# Patient Record
Sex: FEMALE | Race: OTHER
[De-identification: names, ages, dates, MRNs, and addresses within clinical notes are randomized per-mention and may not be internally consistent; named-entity substitution may affect disease eponyms.]

---

## 2022-12-02 ENCOUNTER — HOSPITAL ENCOUNTER (INPATIENT)
Dept: HOSPITAL 47 - EC | Age: 87
LOS: 7 days | Discharge: NURSING, REHAB OR LONG-TERM CARE | DRG: 597 | End: 2022-12-09
Attending: HOSPITALIST | Admitting: HOSPITALIST
Payer: MEDICARE

## 2022-12-02 VITALS — BODY MASS INDEX: 22.5 KG/M2

## 2022-12-02 DIAGNOSIS — L03.116: ICD-10-CM

## 2022-12-02 DIAGNOSIS — C79.2: ICD-10-CM

## 2022-12-02 DIAGNOSIS — Z91.81: ICD-10-CM
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
## 2022-12-02 DIAGNOSIS — Z87.891: ICD-10-CM

## 2022-12-02 DIAGNOSIS — S32.019A: ICD-10-CM

## 2022-12-02 DIAGNOSIS — Z79.1: ICD-10-CM

## 2022-12-02 DIAGNOSIS — L03.313: ICD-10-CM

## 2022-12-02 DIAGNOSIS — B95.7: ICD-10-CM

## 2022-12-02 DIAGNOSIS — I89.0: ICD-10-CM

## 2022-12-02 DIAGNOSIS — R29.6: ICD-10-CM

## 2022-12-02 DIAGNOSIS — C50.912: Primary | ICD-10-CM

## 2022-12-02 DIAGNOSIS — R26.9: ICD-10-CM

## 2022-12-02 DIAGNOSIS — Z17.0: ICD-10-CM

## 2022-12-02 DIAGNOSIS — Z80.3: ICD-10-CM

## 2022-12-02 DIAGNOSIS — B96.89: ICD-10-CM

## 2022-12-02 DIAGNOSIS — L98.492: ICD-10-CM

## 2022-12-02 DIAGNOSIS — R00.0: ICD-10-CM
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
## 2022-12-02 DIAGNOSIS — M51.36: ICD-10-CM

## 2022-12-02 DIAGNOSIS — Z91.199: ICD-10-CM

## 2022-12-02 DIAGNOSIS — S21.002A: ICD-10-CM

## 2022-12-02 DIAGNOSIS — M47.816: ICD-10-CM

## 2022-12-02 DIAGNOSIS — E89.0: ICD-10-CM

## 2022-12-02 DIAGNOSIS — I48.20: ICD-10-CM

## 2022-12-02 DIAGNOSIS — I48.92: ICD-10-CM

## 2022-12-02 DIAGNOSIS — I96: ICD-10-CM

## 2022-12-02 DIAGNOSIS — M79.89: ICD-10-CM

## 2022-12-02 DIAGNOSIS — J90: ICD-10-CM

## 2022-12-02 DIAGNOSIS — G89.29: ICD-10-CM

## 2022-12-02 DIAGNOSIS — J18.9: ICD-10-CM

## 2022-12-02 DIAGNOSIS — M48.54XA: ICD-10-CM

## 2022-12-02 LAB
ALBUMIN SERPL-MCNC: 3.6 G/DL (ref 3.5–5)
ALP SERPL-CCNC: 61 U/L (ref 38–126)
ALT SERPL-CCNC: 24 U/L (ref 4–34)
AMYLASE SERPL-CCNC: 76 U/L (ref 30–110)
ANION GAP SERPL CALC-SCNC: 8 MMOL/L
APTT BLD: 23.3 SEC (ref 22–30)
AST SERPL-CCNC: 34 U/L (ref 14–36)
BASOPHILS # BLD AUTO: 0 K/UL (ref 0–0.2)
BASOPHILS NFR BLD AUTO: 1 %
BUN SERPL-SCNC: 28 MG/DL (ref 7–17)
CALCIUM SPEC-MCNC: 8.9 MG/DL (ref 8.4–10.2)
CHLORIDE SERPL-SCNC: 106 MMOL/L (ref 98–107)
CO2 SERPL-SCNC: 26 MMOL/L (ref 22–30)
EOSINOPHIL # BLD AUTO: 0.1 K/UL (ref 0–0.7)
EOSINOPHIL NFR BLD AUTO: 1 %
ERYTHROCYTE [DISTWIDTH] IN BLOOD BY AUTOMATED COUNT: 3.99 M/UL (ref 3.8–5.4)
ERYTHROCYTE [DISTWIDTH] IN BLOOD: 14.2 % (ref 11.5–15.5)
GLUCOSE SERPL-MCNC: 99 MG/DL (ref 74–99)
HCT VFR BLD AUTO: 40.7 % (ref 34–46)
HGB BLD-MCNC: 13.2 GM/DL (ref 11.4–16)
INR PPP: 0.9 (ref ?–1.2)
LIPASE SERPL-CCNC: 162 U/L (ref 23–300)
LYMPHOCYTES # SPEC AUTO: 0.6 K/UL (ref 1–4.8)
LYMPHOCYTES NFR SPEC AUTO: 10 %
MCH RBC QN AUTO: 33.1 PG (ref 25–35)
MCHC RBC AUTO-ENTMCNC: 32.5 G/DL (ref 31–37)
MCV RBC AUTO: 101.8 FL (ref 80–100)
MONOCYTES # BLD AUTO: 0.6 K/UL (ref 0–1)
MONOCYTES NFR BLD AUTO: 10 %
NEUTROPHILS # BLD AUTO: 4.7 K/UL (ref 1.3–7.7)
NEUTROPHILS NFR BLD AUTO: 78 %
PLATELET # BLD AUTO: 330 K/UL (ref 150–450)
POTASSIUM SERPL-SCNC: 4.2 MMOL/L (ref 3.5–5.1)
PROT SERPL-MCNC: 6.4 G/DL (ref 6.3–8.2)
PT BLD: 9.7 SEC (ref 9–12)
SODIUM SERPL-SCNC: 140 MMOL/L (ref 137–145)
WBC # BLD AUTO: 6 K/UL (ref 3.8–10.6)

## 2022-12-02 PROCEDURE — 70450 CT HEAD/BRAIN W/O DYE: CPT

## 2022-12-02 PROCEDURE — 87070 CULTURE OTHR SPECIMN AEROBIC: CPT

## 2022-12-02 PROCEDURE — 83880 ASSAY OF NATRIURETIC PEPTIDE: CPT

## 2022-12-02 PROCEDURE — 83690 ASSAY OF LIPASE: CPT

## 2022-12-02 PROCEDURE — 76937 US GUIDE VASCULAR ACCESS: CPT

## 2022-12-02 PROCEDURE — 72125 CT NECK SPINE W/O DYE: CPT

## 2022-12-02 PROCEDURE — 80053 COMPREHEN METABOLIC PANEL: CPT

## 2022-12-02 PROCEDURE — 72129 CT CHEST SPINE W/DYE: CPT

## 2022-12-02 PROCEDURE — 72132 CT LUMBAR SPINE W/DYE: CPT

## 2022-12-02 PROCEDURE — 87077 CULTURE AEROBIC IDENTIFY: CPT

## 2022-12-02 PROCEDURE — 81003 URINALYSIS AUTO W/O SCOPE: CPT

## 2022-12-02 PROCEDURE — 85610 PROTHROMBIN TIME: CPT

## 2022-12-02 PROCEDURE — 84439 ASSAY OF FREE THYROXINE: CPT

## 2022-12-02 PROCEDURE — 85025 COMPLETE CBC W/AUTO DIFF WBC: CPT

## 2022-12-02 PROCEDURE — 93005 ELECTROCARDIOGRAM TRACING: CPT

## 2022-12-02 PROCEDURE — 88341 IMHCHEM/IMCYTCHM EA ADD ANTB: CPT

## 2022-12-02 PROCEDURE — 86850 RBC ANTIBODY SCREEN: CPT

## 2022-12-02 PROCEDURE — 86901 BLOOD TYPING SEROLOGIC RH(D): CPT

## 2022-12-02 PROCEDURE — 82150 ASSAY OF AMYLASE: CPT

## 2022-12-02 PROCEDURE — 85730 THROMBOPLASTIN TIME PARTIAL: CPT

## 2022-12-02 PROCEDURE — 83605 ASSAY OF LACTIC ACID: CPT

## 2022-12-02 PROCEDURE — 84443 ASSAY THYROID STIM HORMONE: CPT

## 2022-12-02 PROCEDURE — 96375 TX/PRO/DX INJ NEW DRUG ADDON: CPT

## 2022-12-02 PROCEDURE — 88342 IMHCHEM/IMCYTCHM 1ST ANTB: CPT

## 2022-12-02 PROCEDURE — 72170 X-RAY EXAM OF PELVIS: CPT

## 2022-12-02 PROCEDURE — 86900 BLOOD TYPING SEROLOGIC ABO: CPT

## 2022-12-02 PROCEDURE — 96365 THER/PROPH/DIAG IV INF INIT: CPT

## 2022-12-02 PROCEDURE — 96367 TX/PROPH/DG ADDL SEQ IV INF: CPT

## 2022-12-02 PROCEDURE — 99291 CRITICAL CARE FIRST HOUR: CPT

## 2022-12-02 PROCEDURE — 87040 BLOOD CULTURE FOR BACTERIA: CPT

## 2022-12-02 PROCEDURE — 96366 THER/PROPH/DIAG IV INF ADDON: CPT

## 2022-12-02 PROCEDURE — 74177 CT ABD & PELVIS W/CONTRAST: CPT

## 2022-12-02 PROCEDURE — 96372 THER/PROPH/DIAG INJ SC/IM: CPT

## 2022-12-02 PROCEDURE — 36410 VNPNXR 3YR/> PHY/QHP DX/THER: CPT

## 2022-12-02 PROCEDURE — 36415 COLL VENOUS BLD VENIPUNCTURE: CPT

## 2022-12-02 PROCEDURE — 87075 CULTR BACTERIA EXCEPT BLOOD: CPT

## 2022-12-02 PROCEDURE — 87186 SC STD MICRODIL/AGAR DIL: CPT

## 2022-12-02 PROCEDURE — 78306 BONE IMAGING WHOLE BODY: CPT

## 2022-12-02 PROCEDURE — 80048 BASIC METABOLIC PNL TOTAL CA: CPT

## 2022-12-02 PROCEDURE — 87205 SMEAR GRAM STAIN: CPT

## 2022-12-02 PROCEDURE — 87635 SARS-COV-2 COVID-19 AMP PRB: CPT

## 2022-12-02 PROCEDURE — 88305 TISSUE EXAM BY PATHOLOGIST: CPT

## 2022-12-02 PROCEDURE — 96361 HYDRATE IV INFUSION ADD-ON: CPT

## 2022-12-02 PROCEDURE — 71260 CT THORAX DX C+: CPT

## 2022-12-02 NOTE — CT
EXAMINATION TYPE: CT thor lumbar spine w con

 

DATE OF EXAM: 12/2/2022

 

COMPARISON: None

 

HISTORY: head trauma, minor fall

 

CT DLP: 2056.7 mGycm

Automated exposure control for dose reduction was used.

 

CONTRAST: 

Performed with IV Contrast, patient injected with 100 mL of Isovue 370.

 

 

Images obtained from T1 to S1 with the IV contrast.

 

There is osteopenia. There is no thoracic paraspinal mass. There is left pleural effusion and left lo
wer lobe infiltrate and atelectasis.

 

There is compression deformities with anterior wedging of T12 and L1 and L2 up to 25%. Fractures coul
d be acute. The sacroiliac joints are intact. No focal bone destruction.

 

IMPRESSION:

Multiple compression fractures could be acute fractures in the thoracic and lumbar spine as above..

## 2022-12-02 NOTE — US
EXAMINATION TYPE: US venous doppler duplex UE 

 

DATE OF EXAM: 12/2/2022

 

COMPARISON: NONE

 

CLINICAL HISTORY: Patient has severe swelling of left arm with extensive interstitial edema.

 

SIDE PERFORMED: Left

 

 

Left Arm: Exam very limited due to patient mobility and severe interstitial edema.  Limited visualiza
tion of subclavian vein shows probable patent vein. Flow is thready at this area. Unable to visualize
 axilla vein, brachial vein seen only at antecubital fossa. No obvious DVT, however exam severely pratt
ited. Basilic vein not seen.  

 

 

 

IMPRESSION: 

Exam is limited. No evidence of acute deep vein thrombosis in the left arm. There is some thready jessy
earing flow in the subclavian vein and some limited chronic deep vein thrombosis is possible.

## 2022-12-02 NOTE — CT
EXAMINATION TYPE: CT brain mike wo con

 

DATE OF EXAM: 12/2/2022

 

COMPARISON: None

 

HISTORY: head trauma, minor fall

 

CT DLP: 2056.7 mGycm

Automated exposure control for dose reduction was used.

 

Images of the brain and cervical spine obtained with no contrast.

 

There is diffuse cerebral cortical atrophy. There is no mass effect or midline shift. No sign of intr
acranial hemorrhage. There is some hypodensity in the periventricular white matter. Calvarium is inta
ct.

 

The cervical vertebra show no compression fracture. No significant subluxation deformity. There is mu
ltilevel mild cervical facet arthropathy. The skull base is intact. There is normal aeration of the m
astoid sinuses. Occipital bone is intact.

 

IMPRESSION:

Cerebral atrophy and chronic small vessel ischemia. No acute intracranial abnormality.

 

Mild multilevel cervical spondylotic changes. No fracture seen.

## 2022-12-02 NOTE — CT
EXAMINATION TYPE: CT ChestAbdPelvis w con

 

DATE OF EXAM: 12/2/2022

 

COMPARISON: None

 

HISTORY: head trauma, minor fall

 

CT DLP: 2056.7 mGycm

Automated exposure control for dose reduction was used.

 

CONTRAST: 

Performed with IV Contrast, patient injected with 100 mL of Isovue 370.

 

Images obtained from the thoracic inlet to the floor the pelvis with the IV contrast.

 

There is moderate left-sided pleural effusion. There is airspace consolidation and atelectasis left l
ower lobe. The right lung is fairly clear. Heart is enlarged. No mediastinal adenopathy. There are no
 hilar masses. Thoracic aorta is intact. No aneurysm or dissection. No filling defect seen in the pul
monary arteries.

 

Liver and spleen are intact. There is elevated left diaphragm. Stomach is not dilated. Gallbladder ap
pears intact. No dilated ducts.

 

There is normal contrast opacification of the kidneys. No hydronephrosis. There is a large urinary bl
adder. No free fluid in the pelvis. There is dilated rectum with fecal material that measures 8.5 cm.
 No ascites. No evidence of a bowel obstruction. No free air. The bony pelvis is intact. No evidence 
of hip fracture. There is L2 compression fracture 25%. There is also compression fracture L1 and T12 
up to 20%. These fractures could be relatively acute. The sternum is intact. No evidence of any displ
aced rib fracture. There is evidence for old lateral healed right-sided rib fracture.

Delayed images show normal renal excretion.

IMPRESSION:

Thoracic and lumbar mild compression fractures could be acute. Rectal fecal impaction.

 

Consolidation and atelectasis left lower lobe with left pleural effusion. Mild cardiomegaly..

## 2022-12-02 NOTE — ED
General Adult HPI





- General


Chief complaint: Skin/Abscess/Foreign Body


Stated complaint: lt breast infection, weakness


Time Seen by Provider: 12/02/22 15:15


Source: patient, EMS, RN notes reviewed, old records reviewed


Mode of arrival: EMS


Limitations: no limitations





- History of Present Illness


Initial comments: 





Patient is an 89-year-old female with past medical history remarkable for 

thyroid disorder currently on no medications who has not seen a doctor for 3 

years presents to the emergency department complaining of a worsening wound 

located in her left armpit, left breast.  She is concerned that it may be 

cancer.  States it has been present for multiple months to years, and the open 

wound aspect of it has been present for at least multiple months.  She presents 

today as it is been losing a foul-smelling liquid more, as well as bleeding at t

he site.  She denies any fevers.  Denies any nausea, vomiting, diarrhea.  Denies

any recent weight loss.  States she has been falling more lately, has chronic 

back pain since a fall back in July.  Last fell yesterday.  No obvious injuries 

yesterday.  She denies any jillian chest pain, shortness of breath, abdominal 

pain, nausea, vomiting.  She also endorses left upper extremity swelling.  Has 

no other acute complaint at this time.  Presents for further evaluation at this 

time.Patient states she has not followed up as she has been chronically taking 

care of her  which is been taking up most of her time.  Has not seen a 

doctor in multiple years.





- Related Data


                                Home Medications











 Medication  Instructions  Recorded  Confirmed


 


Acetaminophen [Tylenol Extra 500 mg PO QID 12/02/22 12/02/22





Strength]   











                                    Allergies











Allergy/AdvReac Type Severity Reaction Status Date / Time


 


No Known Allergies Allergy   Verified 12/02/22 15:37














Review of Systems


ROS Statement: 


Those systems with pertinent positive or pertinent negative responses have been 

documented in the HPI.


Review of Systems:


CONST: Denies fever 


EYES: Denies blurry vision 


ENT: Denies nasal congestion  


C/V:  Denies Chest pain


RESP: Denies shortness of breath 


GI: Denies abdominal pain 


: Denies dysuria  


SKIN: Endorses chronic chest wound


MSK: Denies joint pain.


NEURO: Denies headache 


ROS Other: All systems not noted in ROS Statement are negative.





Past Medical History


Past Medical History: Thyroid Disorder


Additional Past Surgical History / Comment(s): thyroidectomy 1955


Smoking Status: Former smoker


Past Alcohol Use History: Occasional


Past Drug Use History: None Reported





General Exam





- General Exam Comments


Initial Comments: 





General: Appears in no acute distress.


HEAD:  Normal with no signs of head trauma.


EYES:  PERRLA, EOMI, conjunctiva normal, no discharge.


ENT:  Hearing grossly intact, normal oropharynx.


RESPIRATORY:  Clear breath sounds bilaterally.  No wheezes, rales, or rhonchi.  

No hypoxia.  No increased work of breathing.


C/V:  Regular rate and rhythm. S1 and S2 auscultated, mild bilateral pitting 

edema of the lower extremities, peripheral pulses 2+ and intact throughout.  

Left upper extremity pitting edema.


ABD:  Abd is soft, nontender, nondistended


EXT: Normal range of motion, no obvious deformity.  Midline lumbar and thoracic 

spine tenderness to palpation.


SKIN: Patient has what appears to be an open wound located over the breast 

tissue on the left which appears to have diminished the volume of breast tissue 

present.  It is oozing blood as well as a serosanguineous/purulent material.  

There is surrounding induration.  There is scabbing.  No obvious fluctuance.  No

crepitus.  Extends from the left breast region to the left axilla.  Erythematous

surrounding the area.  No obvious masses palpated.Old bruising located over the 

left inferior posterior ribs.


NEURO: Alert and oriented x 4.  Cranial nerves II-XII intact. No focal sensory 

or strength deficits.


Limitations: no limitations





Course


                                   Vital Signs











  12/02/22 12/02/22 12/02/22





  14:44 18:51 19:24


 


Temperature 97.8 F  


 


Pulse Rate 60 87 87


 


Respiratory 18 18 16





Rate   


 


Blood Pressure 104/72 139/93 120/70


 


O2 Sat by Pulse 95 95 95





Oximetry   














Medical Decision Making





- Medical Decision Making





Based on the patient's presentation and physical exam, she does present with 

chronic skin wound which I'm concerned may be secondary to cancer.  It is been 

present for multiple weeks to months with symptoms worsening for multiple weeks.

 She has been having left upper extremity swelling for multiple weeks as well.  

She presents today because she fell yesterday and the symptoms are getting 

worse.  We will obtain broad workup including CT imaging of the chest abdomen 

pelvis.  CT brain and spinal also be obtained.  Prior pulmonary labs as well as 

the left upper extremity duplex will be obtained.  She was in agreement this 

plan.  Vital signs are within acceptable limits.





EKG shows no signs of acute ischemia but no prior EKG for comparison.  Patient 

does appear to have new onset atrial flutter ablation.  Laboratory studies are 

within acceptable limits.   Blood and wound cultures were sent.  Pelvic x-ray as

interpreted by myself reveals no acute traumatic injury.  CT imaging of the 

brain, cervical spine, thorax, abdomen, pelvis, thoracic spine, lumbar spine was

remarkable for compression fractures in the lumbar and thoracic spine.  No 

obvious air bubbles to suggest a necrotizing infection located over the left 

chest.  Patient does have a small left pleural effusion.  No other obvious 

findings on exam.  CT brain shows no acute intracranial process.  CT brain and 

the above CT scans were interpreted by myself.





Venous duplex is still pending at this time.





I discussed the findings with the patient as well as the patient's family.  She 

will be started on vancomycin for concern for cellulitis for the left chest 

wound.  We will consult infectious disease to evaluate the wound.  As it does 

have some blood oozing from the site as well as what appears to be some purulent

material, we will hold blood thinners at this time.  Her atrial fibrillation I 

suspect is likely chronic we will have cardiology evaluate the patient and 

determined anticoagulation.  Echo was ordered for the patient.  She was in 

agreement this plan.  I spoke with the admitting physician, Dr. Foster as well as

HANNA Montiel who accepted the patient.  Patient was admitted and serous 

condition.  Orthopedics will be consulted for the patient's compression 

fractures, which are likely chronic from the fall in July she does have chronic 

pain from that fall at those sites.





- Lab Data


Result diagrams: 


                                 12/02/22 15:37





                                 12/02/22 15:37


                                   Lab Results











  12/02/22 12/02/22 12/02/22 Range/Units





  15:20 15:37 15:37 


 


WBC   6.0   (3.8-10.6)  k/uL


 


RBC   3.99   (3.80-5.40)  m/uL


 


Hgb   13.2   (11.4-16.0)  gm/dL


 


Hct   40.7   (34.0-46.0)  %


 


MCV   101.8 H   (80.0-100.0)  fL


 


MCH   33.1   (25.0-35.0)  pg


 


MCHC   32.5   (31.0-37.0)  g/dL


 


RDW   14.2   (11.5-15.5)  %


 


Plt Count   330   (150-450)  k/uL


 


MPV   8.5   


 


Neutrophils %   78   %


 


Lymphocytes %   10   %


 


Monocytes %   10   %


 


Eosinophils %   1   %


 


Basophils %   1   %


 


Neutrophils #   4.7   (1.3-7.7)  k/uL


 


Lymphocytes #   0.6 L   (1.0-4.8)  k/uL


 


Monocytes #   0.6   (0-1.0)  k/uL


 


Eosinophils #   0.1   (0-0.7)  k/uL


 


Basophils #   0.0   (0-0.2)  k/uL


 


Macrocytosis   Slight   


 


PT    9.7  (9.0-12.0)  sec


 


INR    0.9  (<1.2)  


 


APTT    23.3  (22.0-30.0)  sec


 


Sodium     (137-145)  mmol/L


 


Potassium     (3.5-5.1)  mmol/L


 


Chloride     ()  mmol/L


 


Carbon Dioxide     (22-30)  mmol/L


 


Anion Gap     mmol/L


 


BUN     (7-17)  mg/dL


 


Creatinine     (0.52-1.04)  mg/dL


 


Est GFR (CKD-EPI)AfAm     (>60 ml/min/1.73 sqM)  


 


Est GFR (CKD-EPI)NonAf     (>60 ml/min/1.73 sqM)  


 


Glucose     (74-99)  mg/dL


 


Plasma Lactic Acid Arun     (0.7-2.0)  mmol/L


 


Calcium     (8.4-10.2)  mg/dL


 


Total Bilirubin     (0.2-1.3)  mg/dL


 


AST     (14-36)  U/L


 


ALT     (4-34)  U/L


 


Alkaline Phosphatase     ()  U/L


 


Total Protein     (6.3-8.2)  g/dL


 


Albumin     (3.5-5.0)  g/dL


 


Amylase     ()  U/L


 


Lipase     ()  U/L


 


Blood Type  A Positive    


 


Blood Type Confirm     


 


Blood Type Recheck  No Previous Record    


 


Bld Type Recheck Status  CABO Indicated    


 


Antibody Screen  NEGATIVE    


 


Spec Expiration Date  12/05/2022 - 2320 12/02/22 12/02/22 12/02/22 Range/Units





  15:37 15:37 15:37 


 


WBC     (3.8-10.6)  k/uL


 


RBC     (3.80-5.40)  m/uL


 


Hgb     (11.4-16.0)  gm/dL


 


Hct     (34.0-46.0)  %


 


MCV     (80.0-100.0)  fL


 


MCH     (25.0-35.0)  pg


 


MCHC     (31.0-37.0)  g/dL


 


RDW     (11.5-15.5)  %


 


Plt Count     (150-450)  k/uL


 


MPV     


 


Neutrophils %     %


 


Lymphocytes %     %


 


Monocytes %     %


 


Eosinophils %     %


 


Basophils %     %


 


Neutrophils #     (1.3-7.7)  k/uL


 


Lymphocytes #     (1.0-4.8)  k/uL


 


Monocytes #     (0-1.0)  k/uL


 


Eosinophils #     (0-0.7)  k/uL


 


Basophils #     (0-0.2)  k/uL


 


Macrocytosis     


 


PT     (9.0-12.0)  sec


 


INR     (<1.2)  


 


APTT     (22.0-30.0)  sec


 


Sodium  140    (137-145)  mmol/L


 


Potassium  4.2    (3.5-5.1)  mmol/L


 


Chloride  106    ()  mmol/L


 


Carbon Dioxide  26    (22-30)  mmol/L


 


Anion Gap  8    mmol/L


 


BUN  28 H    (7-17)  mg/dL


 


Creatinine  0.63    (0.52-1.04)  mg/dL


 


Est GFR (CKD-EPI)AfAm  >90    (>60 ml/min/1.73 sqM)  


 


Est GFR (CKD-EPI)NonAf  80    (>60 ml/min/1.73 sqM)  


 


Glucose  99    (74-99)  mg/dL


 


Plasma Lactic Acid Arun   1.3   (0.7-2.0)  mmol/L


 


Calcium  8.9    (8.4-10.2)  mg/dL


 


Total Bilirubin  0.7    (0.2-1.3)  mg/dL


 


AST  34    (14-36)  U/L


 


ALT  24    (4-34)  U/L


 


Alkaline Phosphatase  61    ()  U/L


 


Total Protein  6.4    (6.3-8.2)  g/dL


 


Albumin  3.6    (3.5-5.0)  g/dL


 


Amylase  76    ()  U/L


 


Lipase  162    ()  U/L


 


Blood Type     


 


Blood Type Confirm    A Positive  


 


Blood Type Recheck     


 


Bld Type Recheck Status     


 


Antibody Screen     


 


Spec Expiration Date     














- EKG Data


-: EKG Interpreted by Me


EKG Comments: 





12-lead Electrocardiogram Interpretation Note





EKG was reviewed and interpreted by myself. 12-lead ECG performed at 1609 is 

interpreted by me as revealing atrial fibrillation at a rate of 106 beats per 

minute.  Axis is normal.  QRS duration is 82 ms, QTc is 411 ms..  There were no 

ST or T wave abnormalities to suggest myocardial ischemia or injury. R wave 

progression across the precordium was satisfactory. By my interpretation this 

EKG is non-diagnostic for acute ischemia.  No prior EKG for comparison.





Critical Care Time


Critical Care Time: Yes


Total Critical Care Time: 35


Critical Care Time: 





Upon my evaluation, this patient had a high probability of imminent or life-

threatening deterioration due to severe left chest wound, which required my 

direct attention, intervention, and personal management. 


I have personally provided 35 minutes of critical care time exclusive of time 

spent on separately billable procedures. Time includes review of laboratory 

data, radiology results, discussion with consultants, and monitoring for 

potential decompensation. Interventions were performed as documented in my note.





Disposition


Clinical Impression: 


 Wound of left breast, Cellulitis, New onset a-fib, Lumbar compression fracture,

Thoracic compression fracture





Disposition: ADMITTED AS IP TO THIS [facility]


Condition: Stable


Time of Disposition: 18:20

## 2022-12-02 NOTE — XR
EXAMINATION TYPE: XR pelvis AP view

 

DATE OF EXAM: 12/2/2022

 

CLINICAL HISTORY: Fall injury with pain

 

TECHNIQUE: A single AP view of the pelvis is obtained.

 

COMPARISON: None.

 

FINDINGS: Osseous structures are demineralized which is noted to lower radiographic sensitivity. Ther
e is no acute fracture/dislocation evident in the pelvis. Mild-to-moderate axial joint space loss and
 mild acetabular spurring in both hips is present.  Pubic symphysis is intact. Sacroiliac joints are 
grossly preserved. Overlying vascular calcification is seen bilaterally.

 

IMPRESSION:  There is no acute fracture or dislocation in the pelvis.

## 2022-12-03 LAB
ANION GAP SERPL CALC-SCNC: 11.3 MMOL/L (ref 10–18)
BASOPHILS # BLD AUTO: 0.06 X 10*3/UL (ref 0–0.1)
BASOPHILS NFR BLD AUTO: 1 %
BUN SERPL-SCNC: 22.5 MG/DL (ref 9–27)
BUN/CREAT SERPL: 37.5 RATIO (ref 12–20)
CALCIUM SPEC-MCNC: 8.6 MG/DL (ref 8.7–10.3)
CHLORIDE SERPL-SCNC: 104 MMOL/L (ref 96–109)
CO2 SERPL-SCNC: 22.7 MMOL/L (ref 20–27.5)
EOSINOPHIL # BLD AUTO: 0.07 X 10*3/UL (ref 0.04–0.35)
EOSINOPHIL NFR BLD AUTO: 1.2 %
ERYTHROCYTE [DISTWIDTH] IN BLOOD BY AUTOMATED COUNT: 3.62 X 10*6/UL (ref 4.1–5.2)
ERYTHROCYTE [DISTWIDTH] IN BLOOD: 15.5 % (ref 11.5–14.5)
GLUCOSE SERPL-MCNC: 88 MG/DL (ref 70–110)
HCT VFR BLD AUTO: 37.2 % (ref 37.2–46.3)
HGB BLD-MCNC: 11.7 G/DL (ref 12–15)
IMM GRANULOCYTES BLD QL AUTO: 0.3 %
LYMPHOCYTES # SPEC AUTO: 0.73 X 10*3/UL (ref 0.9–5)
LYMPHOCYTES NFR SPEC AUTO: 12.4 %
MCH RBC QN AUTO: 32.3 PG (ref 27–32)
MCHC RBC AUTO-ENTMCNC: 31.5 G/DL (ref 32–37)
MCV RBC AUTO: 102.8 FL (ref 80–97)
MONOCYTES # BLD AUTO: 0.72 X 10*3/UL (ref 0.2–1)
MONOCYTES NFR BLD AUTO: 12.3 %
NEUTROPHILS # BLD AUTO: 4.27 X 10*3/UL (ref 1.8–7.7)
NEUTROPHILS NFR BLD AUTO: 72.8 %
NRBC BLD AUTO-RTO: 0 /100 WBCS (ref 0–0)
PH UR: 5.5 [PH] (ref 5–8)
PLATELET # BLD AUTO: 270 X 10*3/UL (ref 140–440)
POTASSIUM SERPL-SCNC: 4.3 MMOL/L (ref 3.5–5.5)
SODIUM SERPL-SCNC: 138 MMOL/L (ref 135–145)
SP GR UR: 1.03 (ref 1–1.03)
UROBILINOGEN UR QL STRIP: <2 MG/DL (ref ?–2)
WBC # BLD AUTO: 5.87 X 10*3/UL (ref 4.5–10)

## 2022-12-03 RX ADMIN — HEPARIN SODIUM SCH UNIT: 5000 INJECTION INTRAVENOUS; SUBCUTANEOUS at 14:16

## 2022-12-03 RX ADMIN — PIPERACILLIN AND TAZOBACTAM SCH MLS/HR: 3; .375 INJECTION, POWDER, FOR SOLUTION INTRAVENOUS at 19:52

## 2022-12-03 RX ADMIN — PIPERACILLIN AND TAZOBACTAM SCH MLS/HR: 3; .375 INJECTION, POWDER, FOR SOLUTION INTRAVENOUS at 14:15

## 2022-12-03 RX ADMIN — HEPARIN SODIUM SCH: 5000 INJECTION INTRAVENOUS; SUBCUTANEOUS at 19:53

## 2022-12-03 NOTE — HP
HISTORY AND PHYSICAL



CHIEF COMPLAINT:

Ulcerating mass of the left breast and fall.



HISTORY OF PRESENT ILLNESS:

This 89-year-old woman with a past medical history of hypothyroidism, who has not seen

the doctor for the last 3 years had a fall. The patient is complaining of pain in the

back and also pain to the left armpit and the patient had an ulcerating mass in the

left breast.  The patient came to [facility] for evaluation and

treatment.  The mass was foul-smelling.  Cultures are growing gram-negative.  There is

no history of any fever, rigors, chills at this time.



PAST MEDICAL HISTORY:

Thyroid problems.  Rest of the history and chart is reviewed.



HOME MEDICATIONS:

Tylenol.



ALLERGIES:

Unknown.



FAMILY HISTORY:

No history of heart disease or strokes in the family.



SOCIAL HISTORY:

Previous history of smoking, occasional alcohol.



REVIEW OF SYSTEMS:

Fourteen-point review of systems negative except as mentioned earlier.



PHYSICAL EXAMINATION:

VITAL SIGNS: Pulse is 81, blood pressure 130/60, respirations 18.

HEENT: Conjunctivae normal.

NECK:  No jugular venous distention. No carotid bruit.

CARDIOVASCULAR: No murmur.

RESPIRATIONS: Breath sounds diminished at the bases. Few rhonchi. No crackles.

ABDOMEN:  Soft, nontender.

LEGS:  No swelling.

NERVOUS SYSTEM:  No focal deficits.

SKIN:  Left breast, an ulcerating mass present with foul smelling extending to the arm

with left arm lymphedema also present.

BACK: Tenderness present.

JOINTS: No active deforming arthropathy.



LABORATORY DATA:

Hemoglobin 11.0. Rest of the labs are noted.  The venous Doppler on the arm showed no

significant DVT.  The CT showed vertebral fractures and CT of the chest, abdomen,

pelvis showed some atelectasis.



ASSESSMENT:

1. Left breast mass with ulcerated mass and infection, cellulitis.

2. Fall and multiple thoracic compression fractures.

3. New onset atrial fibrillation.

4. Hypothyroidism.

5. Multiple medical issues.

6. History of noncompliance.



RECOMMENDATIONS:

The patient is an 89-year-old woman, who presented with multiple complex medical

issues.  We will monitor the patient closely. We will initiate broad-spectrum IV

antibiotics.  Pain medications.  DVT prophylaxis.  I would recommend Orthopedic as well

as Hematology/Oncology and infectious disease evaluation.  Overall prognosis extremely

guarded.  Further recommendations to follow.  Cardiology also will be consulted for new-

onset atrial fibrillation.





MMODL / IJN: [id number] / Job#: [id number]

## 2022-12-03 NOTE — P.CONS
History of Present Illness





- Reason for Consult


Consult date: 12/03/22


Chest wound/cellulitis


Requesting physician: Dixon Bautista





- Chief Complaint


Pain and nonhealing wound to the left breast area x months





- History of Present Illness


Patient is a 89-year-old  female with a past medical history 

significant for thyroid disorder currently on no medication and apparently has 

not seen a physician in 3 years presenting to the ER for a nonhealing wound to 

the left armpit/ left breast area with the patient has for months to years ho

todd noticed to have worsening over the last few weeks to months is becoming 

more foul-smelling drainage complaining of pain mostly dull aching to sharp 5-6 

or 10 no radiation patient denies any fever patient has been feeling weak and 

falling more lately last fall was yesterday and the day before presentation to 

the hospital with the symptom the patient was evaluated by the ER physician on 

arrival to the ER patient was afebrile and no fever have been recorded 

subsequently patient did have normal white count kidney function has been normal

liver enzymes are normal urine has been negative patient did have blood cultures

drawn which are showing gram-positive cocci patient did have a CT of the chest 

abdominal pelvis some mild skin thickening over the anterior left breast that 

could be cellulitis rectal fecal impaction, consolidation and atelectasis left 

lower lobe patient also have a CT of the thoracic and lumbar spine which did 

shows multiple compression deformities in thoracic and lumbar spine patient is 

currently being treated with vancomycin and Zosyn infectious disease was 

consulted for further management of antibiotic therapy








Review of Systems


Positive point has been  mentioned in the HPI rest of the systems are negative








Past Medical History


Past Medical History: Thyroid Disorder


Additional Past Surgical History / Comment(s): thyroidectomy 1955


Smoking Status: Former smoker


Past Alcohol Use History: Occasional


Past Drug Use History: None Reported





- Past Family History


  ** Father


Family Medical History: Myocardial Infarction (MI)





  ** Mother


Family Medical History: CVA/TIA, Myocardial Infarction (MI)





Medications and Allergies


                                Home Medications











 Medication  Instructions  Recorded  Confirmed  Type


 


Acetaminophen [Tylenol Extra 500 mg PO QID 12/02/22 12/02/22 History





Strength]    


 


Ciprofloxacin HCl [Cipro] 500 mg PO BID 10 Days #20 tab 12/09/22  Rx


 


Folic Acid 1 mg PO DAILY@1200  tab 12/09/22  Rx


 


Heparin Sodium,Porcine [Heparin 5,000 unit SQ Q12HR #60 each 12/09/22  Rx





Sodium]    


 


Melatonin 5 mg PO HS PRN #3 tablet 12/09/22  Rx


 


Multivitamins, Thera [Multivitamin 1 each PO DAILY@1200  tab 12/09/22  Rx





(formulary)]    


 


Sennosides [Senokot] 8.6 mg PO BID #60 tab 12/09/22  Rx


 


Thiamine [Vitamin B-1] 100 mg PO DAILY@1200  tab 12/09/22  Rx


 


bisacodyL [Dulcolax] 10 mg RECTAL ONCE PRN  suppositor 12/09/22  Rx


 


metroNIDAZOLE [Flagyl] 500 mg PO TID 10 Days #30 tab 12/09/22  Rx


 


polyethylene glycoL 3350 [Miralax] 17 gm PO DAILY PRN  packet 12/09/22  Rx








                                    Allergies











Allergy/AdvReac Type Severity Reaction Status Date / Time


 


No Known Allergies Allergy   Verified 12/02/22 15:37














Physical Exam


Vitals: 


                                   Vital Signs











  Temp Pulse Pulse Resp BP BP BP


 


 12/03/22 09:11   82   16  122/78  


 


 12/03/22 04:00    81  18    136/69


 


 12/03/22 02:00    93  18   


 


 12/02/22 23:02  97.9 F   86  16   111/63 


 


 12/02/22 19:24   87   16  120/70  


 


 12/02/22 18:51   87   18  139/93  


 


 12/02/22 14:44  97.8 F  60   18  104/72  














  Pulse Ox


 


 12/03/22 09:11  99


 


 12/03/22 04:00  96


 


 12/03/22 02:00 


 


 12/02/22 23:02  95


 


 12/02/22 19:24  95


 


 12/02/22 18:51  95


 


 12/02/22 14:44  95








                                Intake and Output











 12/02/22 12/03/22 12/03/22





 22:59 06:59 14:59


 


Other:   


 


  Voiding Method  Bedpan 


 


  # Voids 0 0 











GENERAL DESCRIPTION: Elderly female lying in bed, no distress. No tachypnea or 

accessory muscle of respiration use.


HEENT: Shows Pallor , no scleral icterus. Oral mucous membrane is dry. No 

pharyngeal erythema or thrush


NECK: Trachea central, no thyromegaly.


LUNGS: Unlabored breathing. Clear to auscultation anteriorly. No wheeze or 

crackle.


HEART: S1, S2, regular rate and rhythm. No loud murmur


ABDOMEN: Soft, no tenderness , guarding or rigidity, no organomegaly


EXTREMITIES: No edema of feet.


SKIN: No left chest wall and axilla did have a large wound with foul-smelling 

drainage.


NEUROLOGICAL: The patient is awake, alert, oriented x3, mood and affect normal.

















Results


CBC & Chem 7: 


                                 12/08/22 06:41





                                 12/08/22 06:41


Labs: 


                  Abnormal Lab Results - Last 24 Hours (Table)











  12/02/22 12/02/22 12/03/22 Range/Units





  15:37 15:37 08:20 


 


RBC    3.62 L  (4.10-5.20)  X 10*6/uL


 


Hgb    11.7 L  (12.0-15.0)  g/dL


 


MCV  101.8 H   102.8 H  (80.0-100.0)  fL


 


MCH    32.3 H  (27.0-32.0)  pg


 


MCHC    31.5 L  (32.0-37.0)  g/dL


 


RDW    15.5 H  (11.5-14.5)  %


 


Lymphocytes #  0.6 L   0.73 L  (1.0-4.8)  k/uL


 


BUN   28 H   (7-17)  mg/dL


 


BUN/Creatinine Ratio     (12.00-20.00)  Ratio


 


Calcium     (8.7-10.3)  mg/dL


 


Urine Protein     (Negative)  














  12/03/22 12/03/22 Range/Units





  08:20 09:10 


 


RBC    (4.10-5.20)  X 10*6/uL


 


Hgb    (12.0-15.0)  g/dL


 


MCV    (80.0-100.0)  fL


 


MCH    (27.0-32.0)  pg


 


MCHC    (32.0-37.0)  g/dL


 


RDW    (11.5-14.5)  %


 


Lymphocytes #    (1.0-4.8)  k/uL


 


BUN    (7-17)  mg/dL


 


BUN/Creatinine Ratio  37.50 H   (12.00-20.00)  Ratio


 


Calcium  8.6 L   (8.7-10.3)  mg/dL


 


Urine Protein   Trace H  (Negative)  








                      Microbiology - Last 24 Hours (Table)











 12/02/22 15:15 Blood Culture - Final





 Blood 


 


 12/02/22 15:53 Gram Stain - Preliminary





 Axilla - Left Wound Culture - Preliminary


 


 12/02/22 15:53 Anaerobic Culture - Preliminary





 Axilla - Left 














Assessment and Plan


(1) Cellulitis


Status: Acute   Code(s): L03.90 - CELLULITIS, UNSPECIFIED   SNOMED Code(s): 

683854926


   





(2) Wound of left breast


Status: Acute   Priority: High   Code(s): S21.002A - UNSPECIFIED OPEN WOUND OF 

LEFT BREAST, INITIAL ENCOUNTER   SNOMED Code(s): 63440467


   


Plan: 


1patient with a chronic nonhealing wound to the left chest wall and armpit area

 with concern for possible malignancy as did have hard margins and no 

significant redness some drainage and foul-smelling possible secondary bacterial

 infection not entirely excluded.


2patient will benefit from biopsy and surgical debridement 


3-patient to continue with the Zosyn while waiting for the culture to finalize, 

however discontinue vancomycin to decrease risk of nephrotoxicity


We will follow on clinical condition and cultures to further adjust medication 

if needed


Thank you for this consultation will follow this patient along with you





Time with Patient: Greater than 30

## 2022-12-03 NOTE — P.CNOR
History of Present Illness





- hospitals


Consult date: 12/03/22


Consult reason: fracture, back pain


History of present illness: 





The patient is very pleasant 89-year-old female who was seen and examined in the

ER at bedside today.  She is accompanied by her daughter and son.  Apparently 

patient is being evaluated in the emergency room and seen due to cellulitis at 

her breast as well as swelling and cellulitis at her left lower extremity.  She 

is also been having pain at her back and sustained a fall on Thursday.  She says

she has new pain at her mid back since her fall on Thursday.  She says she did 

have pain earlier this year in July after sustaining a fall as well.  She says 

that pain was much worse than his current pain but it was improving until she 

fell again the other day.  She denies any new changes with numbness and tingling

in her lower extremity is.  She has some swelling in her left foot and lower 

leg.  She denies prior problems with her lower extremities.  She denies any 

change in bowel bladder function.  She has not had specific treatment for her sp

ine in the past.





Review of Systems





As stated per HPI.


Apparently she is normally a limited community ambulate or put able to get 

around.  She says she fell while picking up dog poop


She admits some changes in her breast with some drainage.  She also admits to 

swelling and some erythema at her left lower leg


She denies any new changes in bowel bladder function.  She denies any new 

numbness tingling her lower extremities





Past Medical History


Past Medical History: Thyroid Disorder


Additional Past Surgical History / Comment(s): thyroidectomy 1955


Smoking Status: Former smoker


Past Alcohol Use History: Occasional


Past Drug Use History: None Reported





Medications and Allergies


                                Home Medications











 Medication  Instructions  Recorded  Confirmed  Type


 


Acetaminophen [Tylenol Extra 500 mg PO QID 12/02/22 12/02/22 History





Strength]    








                                    Allergies











Allergy/AdvReac Type Severity Reaction Status Date / Time


 


No Known Allergies Allergy   Verified 12/02/22 15:37














Physical Examination


Osteopathic Statement: *.  No significant issues noted on an osteopathic 

structural exam other than those noted in the History and Physical/Consult.





- L Spine: dermatomal strength & reflexes


  ** bilateral


Strength: hip flexion: 5/5 (At her back she has tenderness to palpation over her

thoracolumbar junction.  There is no open wounds lacerations or abrasions.  She 

is nontender over her lower back or sacrum.  Her lower extremities have 

sustained dorsal flexion plantarflexion and EHL.  There is some diffuse swelling

at her left lo)


Strength: hip extension: 5/5 (She has good strength at her legs but she has some

diffuse swelling in her left lower extremity)





Results





- Labs


Labs: 


                  Abnormal Lab Results - Last 24 Hours (Table)











  12/02/22 12/02/22 12/03/22 Range/Units





  15:37 15:37 08:20 


 


RBC    3.62 L  (4.10-5.20)  X 10*6/uL


 


Hgb    11.7 L  (12.0-15.0)  g/dL


 


MCV  101.8 H   102.8 H  (80.0-100.0)  fL


 


MCH    32.3 H  (27.0-32.0)  pg


 


MCHC    31.5 L  (32.0-37.0)  g/dL


 


RDW    15.5 H  (11.5-14.5)  %


 


Lymphocytes #  0.6 L   0.73 L  (1.0-4.8)  k/uL


 


BUN   28 H   (7-17)  mg/dL


 


BUN/Creatinine Ratio     (12.00-20.00)  Ratio


 


Calcium     (8.7-10.3)  mg/dL


 


Urine Protein     (Negative)  














  12/03/22 12/03/22 Range/Units





  08:20 09:10 


 


RBC    (4.10-5.20)  X 10*6/uL


 


Hgb    (12.0-15.0)  g/dL


 


MCV    (80.0-100.0)  fL


 


MCH    (27.0-32.0)  pg


 


MCHC    (32.0-37.0)  g/dL


 


RDW    (11.5-14.5)  %


 


Lymphocytes #    (1.0-4.8)  k/uL


 


BUN    (7-17)  mg/dL


 


BUN/Creatinine Ratio  37.50 H   (12.00-20.00)  Ratio


 


Calcium  8.6 L   (8.7-10.3)  mg/dL


 


Urine Protein   Trace H  (Negative)  








                      Microbiology - Last 24 Hours (Table)











 12/02/22 15:15 Blood Culture - Final





 Blood 


 


 12/02/22 15:53 Gram Stain - Preliminary





 Axilla - Left Wound Culture - Preliminary


 


 12/02/22 15:53 Anaerobic Culture - Preliminary





 Axilla - Left 








                                      H & H











  12/02/22 12/03/22 Range/Units





  15:37 08:20 


 


Hgb  13.2  11.7 L  (11.4-16.0)  gm/dL


 


Hct  40.7  37.2  (34.0-46.0)  %








                                   Coagulation











  12/02/22 Range/Units





  15:37 


 


INR  0.9  (<1.2)  











Result Diagrams: 


                                 12/03/22 08:20





                                 12/03/22 08:20





- Diagnostic results


CT Scan - lumbar: report reviewed (There is significant disc degeneration and 

facet arthrosis and lumbar spine.  There is approximately 20-40% anterior height

loss each at T12-L1 and L2), image reviewed (Imaging of the computed tomography 

scan of the thoracic lumbar spine reviewed as is the report.  It shows evidence 

of compression deformities at T12-L1 and L2.  It is difficult to determine the 

chronicity of the fractures on CT.  There does seem to be a relatively newer 

fracture line at L1.  There i)





Assessment and Plan


Assessment: 





T12-L1 and L2 vertebral compression deformities of uncertain chronicity


Likely acute compression deformity at L1


No obvious neurologic deficit


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


Status post fall on Thursday


Breast changes and left lower extremity cellulitis


Plan: 





T12-L1 and L2 vertebral compression deformities of uncertain chronicity


Likely acute compression deformity at L1


No obvious neurologic deficit


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


Status post fall on Thursday


Breast changes and left lower extremity cellulitis





The patient has a number of changes at her thoracic lumbar spine and likely has 

a new fracture at L1.  It is difficult to determine if the T12 and L2 fractures 

are new or but they appear to be somewhat subacute and the fracture lines at L1 

appear to be more acute.  She her exam does correlate with pain over the 

thoracolumbar junction.  She is not having acute neurologic deficit in her lower

extremities and I think that she can have benefit with conservative treatment 

with bracing.


We will order her a high chairback LSO.  Hopefully she will be able to wear this

and still I have access and treatment with her breast tissue.  She is continue 

management for this as well as for her lower extremity cellulitis with medicine.


We will order the LSO brace and she can try to mobilize with the brace intact.  

We'll see discussed the possibility of vertebral kyphoplasty but certainly would

like to avoid surgical intervention if possible as with the patient and her 

family.  We will try conservative treatment over the next several weeks to see 

if this allows the fracture to heal appropriately with her comfort.  We'll 

follow her along with you for now.  We will order the brace and have her 

mobilize with physical therapy.

## 2022-12-03 NOTE — PN
PROGRESS NOTE



SUBJECTIVE:

Pita is an 89-year-old lady, who was admitted to hospital having had a fall at home

and we were consulted for atrial fibrillation.  Her EKG reads out as atrial

fibrillation, but she is really in sinus rhythm with nonspecific ST-T wave changes and

sinus tachycardia.  The patient has a non-healing ulcer on the left side, probably

represents an invasive breast cancer.  She has not been seen by a physician for many

many years.  This mass on the left chest has been there for a long time.  The patient

does not have any chest pain or difficulty in breathing.  She does not have any

palpitations or syncope. For unclear reason, she had a BNP tested that comes back

elevated.  She does not have shortness of breath and does not have any leg edema and

the elevated BNP is of unclear clinical significance.



PAST MEDICAL HISTORY:

Negative for hypertension, diabetes, dyslipidemia. Significant for long-standing mass

lesion on her chest.



MEDICATIONS:

None.



ALLERGIES:

None.



FAMILY HISTORY:

Negative for premature coronary artery disease.



SOCIAL HISTORY:

Negative for current smoking, EtOH abuse, or drug abuse.



REVIEW OF SYSTEMS:

14/14 review of systems has been performed.  Pertinents are as documented on exam.



PHYSICAL EXAMINATION:

GENERAL:  Comfortable at rest.

VITAL SIGNS:  Heart rate is 80 beats per minute, blood pressure is 132/75, respiratory

rate is 18, O2 saturation is 99% on room air.

NECK:  There is no jugular venous distention.  Carotid upstroke is normal.

CHEST:  Reveals good air entry bilaterally.

HEART:  Reveals first and second heart sounds and a systolic murmur at the left lower

sternal border.

ABDOMEN:  Soft.

EXTREMITIES:  Exam of extremities did not reveal any edema.  She has a large ulcerated

mass over the left breast, probably represents an invasive breast cancer.



ASSESSMENT AND PLAN:

1. Cardiac arrhythmia.

2. Breast cancer with skin invasion.



PLAN:

The patient is not in atrial fibrillation.  She has an abnormal EKG.  We will obtain a

2D echo on Monday to assess her wall motion and LV function primarily to rule out prior

myocardial infarction.





MMODL / IJN: [id number] / Job#: [id number]

## 2022-12-04 LAB
ANION GAP SERPL CALC-SCNC: 3 MMOL/L
BASOPHILS # BLD AUTO: 0.1 K/UL (ref 0–0.2)
BASOPHILS NFR BLD AUTO: 1 %
BUN SERPL-SCNC: 19 MG/DL (ref 7–17)
CALCIUM SPEC-MCNC: 7.9 MG/DL (ref 8.4–10.2)
CHLORIDE SERPL-SCNC: 107 MMOL/L (ref 98–107)
CO2 SERPL-SCNC: 26 MMOL/L (ref 22–30)
EOSINOPHIL # BLD AUTO: 0.2 K/UL (ref 0–0.7)
EOSINOPHIL NFR BLD AUTO: 3 %
ERYTHROCYTE [DISTWIDTH] IN BLOOD BY AUTOMATED COUNT: 3.54 M/UL (ref 3.8–5.4)
ERYTHROCYTE [DISTWIDTH] IN BLOOD: 14.5 % (ref 11.5–15.5)
GLUCOSE SERPL-MCNC: 103 MG/DL (ref 74–99)
HCT VFR BLD AUTO: 36.8 % (ref 34–46)
HGB BLD-MCNC: 11.6 GM/DL (ref 11.4–16)
LYMPHOCYTES # SPEC AUTO: 0.7 K/UL (ref 1–4.8)
LYMPHOCYTES NFR SPEC AUTO: 11 %
MCH RBC QN AUTO: 32.9 PG (ref 25–35)
MCHC RBC AUTO-ENTMCNC: 31.6 G/DL (ref 31–37)
MCV RBC AUTO: 104 FL (ref 80–100)
MONOCYTES # BLD AUTO: 0.5 K/UL (ref 0–1)
MONOCYTES NFR BLD AUTO: 8 %
NEUTROPHILS # BLD AUTO: 4.6 K/UL (ref 1.3–7.7)
NEUTROPHILS NFR BLD AUTO: 76 %
PLATELET # BLD AUTO: 289 K/UL (ref 150–450)
POTASSIUM SERPL-SCNC: 4.3 MMOL/L (ref 3.5–5.1)
SODIUM SERPL-SCNC: 136 MMOL/L (ref 137–145)
T4 FREE SERPL-MCNC: 1.47 NG/DL (ref 0.78–2.19)
WBC # BLD AUTO: 6.1 K/UL (ref 3.8–10.6)

## 2022-12-04 RX ADMIN — HEPARIN SODIUM SCH UNIT: 5000 INJECTION INTRAVENOUS; SUBCUTANEOUS at 19:42

## 2022-12-04 RX ADMIN — THERA TABS SCH EACH: TAB at 10:59

## 2022-12-04 RX ADMIN — PIPERACILLIN AND TAZOBACTAM SCH MLS/HR: 3; .375 INJECTION, POWDER, FOR SOLUTION INTRAVENOUS at 03:02

## 2022-12-04 RX ADMIN — FOLIC ACID SCH MG: 1 TABLET ORAL at 11:03

## 2022-12-04 RX ADMIN — Medication SCH MG: at 10:59

## 2022-12-04 RX ADMIN — HEPARIN SODIUM SCH UNIT: 5000 INJECTION INTRAVENOUS; SUBCUTANEOUS at 09:03

## 2022-12-04 RX ADMIN — PIPERACILLIN AND TAZOBACTAM SCH MLS/HR: 3; .375 INJECTION, POWDER, FOR SOLUTION INTRAVENOUS at 10:59

## 2022-12-04 RX ADMIN — PIPERACILLIN AND TAZOBACTAM SCH MLS/HR: 3; .375 INJECTION, POWDER, FOR SOLUTION INTRAVENOUS at 19:42

## 2022-12-04 NOTE — P.PN
Subjective


Progress Note Date: 12/04/22





Patient is doing well resting comfortably in bed in no signs of acute distress. 

She denies chest pain or increasing shortness of breath.  She remains sinus 

rhythm on the monitor.  Patient was awaiting an echocardiogram.  She is tender 

to the touch on the left side of the chest and axillary.  due to a nonhealing 

ulcer on the left side probably representing invasive breast cancer.  She does 

not wish to undergo an echocardiogram due to the discomfort of that area.  At  

this time patient remains asymptomatic, patient's issue seem to be mainly 

related to to the unhealed ulcer.  At this time further cardiac intervention is 

needed we'll continue to follow patient as needed.  


 





Objective





- Vital Signs


Vital signs: 


                                   Vital Signs











Temp  97.1 F L  12/04/22 11:14


 


Pulse  83   12/04/22 11:14


 


Resp  16   12/04/22 11:14


 


BP  129/72   12/04/22 11:14


 


Pulse Ox  94 L  12/04/22 11:14


 


FiO2      








                                 Intake & Output











 12/03/22 12/04/22 12/04/22





 18:59 06:59 18:59


 


Intake Total   305


 


Output Total  250 175


 


Balance  -250 130


 


Weight 72.575 kg  


 


Intake:   


 


  IV   5


 


    Invasive Line 2   5


 


  Oral   300


 


Output:   


 


  Urine  250 175


 


Other:   


 


  Voiding Method Indwelling Catheter Indwelling Catheter Indwelling Catheter














- Exam





PHYSICAL EXAM: 


VITAL SIGNS: Reviewed.


GENERAL: Well-developed in no acute distress. 


HEENT: Head is normocephalic. Pupils are equal, round. Sclerae anicteric. Mucous

membranes of the mouth are moist. 


NECK: Supple. No JVD or thyromegaly


RESPIRATORY: Respirations even and unlabored. Lungs diminished to auscultation 

bilaterally.


CARDIO: Regular rate and rhythm.  S1 and S2 heard. No murmur or gallops.


EXTREMITIES: Normal range of motion.   Peripheral pulses intact.  Large 

ulcerated mass over the left breast tender to the touch left upper extremity 

edema


NEURO: Orientated to person, time, mood is appropriate





- Labs


CBC & Chem 7: 


                                 12/04/22 08:41





                                 12/04/22 08:41


Labs: 


                  Abnormal Lab Results - Last 24 Hours (Table)











  12/04/22 12/04/22 Range/Units





  08:41 08:41 


 


RBC  3.54 L   (3.80-5.40)  m/uL


 


MCV  104.0 H   (80.0-100.0)  fL


 


Lymphocytes #  0.7 L   (1.0-4.8)  k/uL


 


Sodium   136 L  (137-145)  mmol/L


 


BUN   19 H  (7-17)  mg/dL


 


Glucose   103 H  (74-99)  mg/dL


 


Calcium   7.9 L  (8.4-10.2)  mg/dL


 


TSH   6.840 H  (0.465-4.680)  mIU/L








                      Microbiology - Last 24 Hours (Table)











 12/02/22 15:30 Blood Culture Gram Stain - Preliminary





 Blood 


 


 12/02/22 15:30 Blood Culture - Final





 Blood 


 


 12/02/22 15:53 Gram Stain - Preliminary





 Axilla - Left Wound Culture - Preliminary





    Gram Neg Bacilli


 


 12/02/22 15:15 Blood Culture Gram Stain - Preliminary





 Blood Blood Culture - Preliminary





    Coagulase Negative Staph


 


 12/02/22 15:15 Blood Culture - Final





 Blood 














Assessment and Plan


Assessment: 





Cardiac arrhythmia


Possible breast cancer with skin invasion


Plan: 


Continue to follow on an as-needed basis


Continue with all current cardiac medications


Continue with telemetry monitoring


Echocardiogram canceled, per patient


Further recommendations based on clinical course





The above impression and plan of care have been discussed and directed by the 

signing physician. Talya Mendes, nurse practitioner, acting as scribe for 

signing physician.

## 2022-12-04 NOTE — P.CONS
History of Present Illness





- Reason for Consult


Consult date: 12/04/22


malignancy


Requesting physician: Werner Foster





- Chief Complaint


Foul smelling odor from breast wound





- History of Present Illness





Ms. Segovia is a very pleasant 88 yo female with no PMH who is here for fall as 

well as foul smelling odor from a chronic left breast/axillary wound.  States 

wound in left breast and axilla began several years ago.  Initially felt a small

lump lateral to her left nipple which she thought was a cyst however this slowly

progressively grew.  A few months ago she developed an open wound there.  Now 

noticing new foul smelling discharge which prompted ED visit.  Also admits to 

multiple falls, first one in June 2022.  Work up with CT head and C spine 

overall negative, CBC normal with WBC 6, Hgb 13, .8, normal platelets, 

normal CMP and coag's, pelvic X ray overall unremarkable.  CT CAP with left 

lower lung consolidation and pleural effusion as well as T and L spine 

compression fractures.  CT T and L spine confirmed multiple scattered 

compression fractures, possibly acute.  LUE doppler was negative for DVT.  

Admitted with IV antibiotics.  We were consulted for concern of malignancy.





She does admit to weight loss however says that she has not been eating very 

well since the summer due to the pain from her back after her falls.  She does 

live alone and is very independent, complete her ADLs on her own.  Denies any 

smoking, alcohol, or drug use.











Past Medical History


Past Medical History: Thyroid Disorder


Additional Past Surgical History / Comment(s): thyroidectomy 1955


Smoking Status: Former smoker


Past Alcohol Use History: Occasional


Past Drug Use History: None Reported





- Past Family History


  ** Father


Family Medical History: Myocardial Infarction (MI)





  ** Mother


Family Medical History: CVA/TIA, Myocardial Infarction (MI)





Medications and Allergies


                                Home Medications











 Medication  Instructions  Recorded  Confirmed  Type


 


Acetaminophen [Tylenol Extra 500 mg PO QID 12/02/22 12/02/22 History





Strength]    








                                    Allergies











Allergy/AdvReac Type Severity Reaction Status Date / Time


 


No Known Allergies Allergy   Verified 12/02/22 15:37














Physical Exam


Vitals: 


                                   Vital Signs











  Temp Pulse Pulse Resp BP BP BP


 


 12/03/22 17:10  97.8 F  79   18  127/68  


 


 12/03/22 14:22  97.7 F  84   18  135/82  


 


 12/03/22 09:11   82   16  122/78  


 


 12/03/22 04:00    81  18    136/69


 


 12/03/22 02:00    93  18   


 


 12/02/22 23:02  97.9 F   86  16   111/63 


 


 12/02/22 19:24   87   16  120/70  


 


 12/02/22 18:51   87   18  139/93  














  Pulse Ox


 


 12/03/22 17:10  94 L


 


 12/03/22 14:22  94 L


 


 12/03/22 09:11  99


 


 12/03/22 04:00  96


 


 12/03/22 02:00 


 


 12/02/22 23:02  95


 


 12/02/22 19:24  95


 


 12/02/22 18:51  95








                                Intake and Output











 12/03/22 12/03/22 12/03/22





 06:59 14:59 22:59


 


Other:   


 


  Voiding Method Bedpan  


 


  # Voids 0  














Gen.: No acute distress.


HEENT: Mucosa moist.


Breast: She has a very extensive large ulcerating left breast mass with necrosis

occupying her entire left breast and spreading into her left axilla with 

associated significant left upper extremity swelling from lymphedema.  She also 

has subcutaneous metastatic lesions scattered in the left chest wall and left 

upper abdominal wall as well as extending onto the left neck region.  Area is 

nontender.  States that it's staying but it does not truly her.


Lungs: No respiratory distress.


Heart: Regular rate.


Abdomen: Soft.


Neuro: Alert and oriented 3.


Skin: No jaundice.


Psych: Appropriate affect.





Results


CBC & Chem 7: 


                                 12/04/22 08:41





                                 12/04/22 08:41


Labs: 


                  Abnormal Lab Results - Last 24 Hours (Table)











  12/03/22 12/03/22 12/03/22 Range/Units





  08:20 08:20 09:10 


 


RBC  3.62 L    (4.10-5.20)  X 10*6/uL


 


Hgb  11.7 L    (12.0-15.0)  g/dL


 


MCV  102.8 H    (80.0-97.0)  fL


 


MCH  32.3 H    (27.0-32.0)  pg


 


MCHC  31.5 L    (32.0-37.0)  g/dL


 


RDW  15.5 H    (11.5-14.5)  %


 


Lymphocytes #  0.73 L    (0.90-5.00)  X 10*3/uL


 


BUN/Creatinine Ratio   37.50 H   (12.00-20.00)  Ratio


 


Calcium   8.6 L   (8.7-10.3)  mg/dL


 


Urine Protein    Trace H  (Negative)  








                      Microbiology - Last 24 Hours (Table)











 12/02/22 15:15 Blood Culture - Final





 Blood 


 


 12/02/22 15:53 Gram Stain - Preliminary





 Axilla - Left Wound Culture - Preliminary


 


 12/02/22 15:53 Anaerobic Culture - Preliminary





 Axilla - Left 











CT scan - abdomen: report reviewed


CT scan - chest: report reviewed


CT Scan - head: report reviewed


CT scan - pelvis: report reviewed


Venous US: report reviewed





Assessment and Plan


Assessment: 





1. Left breast/axillary breast infected wound


2. Left breast/axillary changes concerning for malignancy


3. Pneumonia


4. Pleural effusion


5. Compression fractures


6. Falls





Plan: 





Ms. Segovia is a very pleasant 88 yo female with no significant PMH who is here 

for foul smelling discharge from chronic left breast/axillary wound as well as 

recurrent falls.  Work up concerning for LLL PNA with pleural effusion, multiple

compression fractures, infected axillary/breast wound and breast mass 

encompassing the entire left breast, extending into the left axilla as well as 

up into the left neck region and left upper abdominal region.  She also has a 

lot of scattered subcutaneous skin nodules that appear to be metastases to the 

skin throughout her left chest, upper abdomen, and neck and extending into the 

axilla.  Left upper extremity swelling likely due to lymphedema in her left mass

is likely due to breast cancer with metastases to the regional skin.  





Pt will need a biopsy of the breast mass, preferably one of the skin nodules, to

assess for malignancy.  Will also need thoracentesis for cytology and cultures. 

Agree with antibiotics for likely superimposed infection of the breast, as well 

as for possible pneumonia.  Will continue to follow pt with you. 





Discussed with pt and her family at bedside and they are agreeable.  All 

questions answered.  Discussed with nursing staff.

## 2022-12-05 LAB
ANION GAP SERPL CALC-SCNC: 3 MMOL/L
BASOPHILS # BLD AUTO: 0 K/UL (ref 0–0.2)
BASOPHILS NFR BLD AUTO: 1 %
BUN SERPL-SCNC: 16 MG/DL (ref 7–17)
CALCIUM SPEC-MCNC: 7.8 MG/DL (ref 8.4–10.2)
CHLORIDE SERPL-SCNC: 106 MMOL/L (ref 98–107)
CO2 SERPL-SCNC: 27 MMOL/L (ref 22–30)
EOSINOPHIL # BLD AUTO: 0.1 K/UL (ref 0–0.7)
EOSINOPHIL NFR BLD AUTO: 2 %
ERYTHROCYTE [DISTWIDTH] IN BLOOD BY AUTOMATED COUNT: 3.62 M/UL (ref 3.8–5.4)
ERYTHROCYTE [DISTWIDTH] IN BLOOD: 14.6 % (ref 11.5–15.5)
GLUCOSE SERPL-MCNC: 116 MG/DL (ref 74–99)
HCT VFR BLD AUTO: 37.6 % (ref 34–46)
HGB BLD-MCNC: 11.9 GM/DL (ref 11.4–16)
LYMPHOCYTES # SPEC AUTO: 0.6 K/UL (ref 1–4.8)
LYMPHOCYTES NFR SPEC AUTO: 12 %
MCH RBC QN AUTO: 33 PG (ref 25–35)
MCHC RBC AUTO-ENTMCNC: 31.8 G/DL (ref 31–37)
MCV RBC AUTO: 103.8 FL (ref 80–100)
MONOCYTES # BLD AUTO: 0.3 K/UL (ref 0–1)
MONOCYTES NFR BLD AUTO: 5 %
NEUTROPHILS # BLD AUTO: 4.1 K/UL (ref 1.3–7.7)
NEUTROPHILS NFR BLD AUTO: 78 %
PLATELET # BLD AUTO: 322 K/UL (ref 150–450)
POTASSIUM SERPL-SCNC: 4.6 MMOL/L (ref 3.5–5.1)
SODIUM SERPL-SCNC: 136 MMOL/L (ref 137–145)
WBC # BLD AUTO: 5.3 K/UL (ref 3.8–10.6)

## 2022-12-05 RX ADMIN — FOLIC ACID SCH MG: 1 TABLET ORAL at 12:47

## 2022-12-05 RX ADMIN — PIPERACILLIN AND TAZOBACTAM SCH MLS/HR: 3; .375 INJECTION, POWDER, FOR SOLUTION INTRAVENOUS at 12:49

## 2022-12-05 RX ADMIN — PIPERACILLIN AND TAZOBACTAM SCH MLS/HR: 3; .375 INJECTION, POWDER, FOR SOLUTION INTRAVENOUS at 03:15

## 2022-12-05 RX ADMIN — HEPARIN SODIUM SCH UNIT: 5000 INJECTION INTRAVENOUS; SUBCUTANEOUS at 08:34

## 2022-12-05 RX ADMIN — THERA TABS SCH EACH: TAB at 12:47

## 2022-12-05 RX ADMIN — Medication SCH MG: at 12:47

## 2022-12-05 RX ADMIN — PIPERACILLIN AND TAZOBACTAM SCH MLS/HR: 3; .375 INJECTION, POWDER, FOR SOLUTION INTRAVENOUS at 20:19

## 2022-12-05 RX ADMIN — HEPARIN SODIUM SCH UNIT: 5000 INJECTION INTRAVENOUS; SUBCUTANEOUS at 20:19

## 2022-12-05 NOTE — P.PN
Progress Note - Text


Progress Note Date: 12/05/22





Orthopedic spine:





History of present illness:





Patient is a very pleasant 89-year-old female who is seen and examined at 

bedside for follow up evaluation of her thoracolumbar spine.  She is known to 

have compression fracture deformities of T12, L1, and L2 with likely acute 

compression fracture deformity of L1.  Currently, her thoracolumbar pain is 

fairly well controlled.  She is resting in bed comfortably.  She states she does

have some pain towards her lumbosacral spine.  She declines to have her 

thoracolumbar spine examined today.  Restriction was written to obtain high-back

LSO bracing over the weekend.  This brace has not been delivered yet.  Patient 

states she was told it should be delivered and fitted appropriately later this 

afternoon.  Patient denies any lower extremity weakness or radiculopathy.





Patient is currently being seen by multiple other medical providers.  She does 

have some breast changes and left lower extremity cellulitis.  She is currently 

on antibiotics per infectious disease.  Patient states they are planning for 

biopsy of left breast mass, which is concerning for malignancy.  She is also 

being treated for pneumonia, thyroid disorder and pleural effusion.








Physical exam:





Patient is awake, alert, and oriented 3


Vital signs stable


Good chest excursion with deep inspiration and expiration


Patient declines examination of her thoracolumbar spine.


Dorsiflexion, plantarflexion, and extensor hallucis longus positive sustained 

bilaterally


No signs or symptoms of DVT; no calf pain


Neurovascularly intact








Assessment:





Thoracolumbar pain


Lumbosacral pain


Status post fall


T12, L1, and L2 compression fracture deformities


L1 likely acute compression fracture deformity


Left breast/axillary nonhealing wound/mass concerning for malignancy


Pneumonia


Pleural effusion


Thyroid disorder








Plan:





1.  After reviewing of imaging, physical examination the patient, and further 

discussion with the patient, will currently planned to continue with conservati

ve treatment at this time.   At this time we'll plan for bracing.  A 

prescription has been written and provided to case management for a High back 

LSO brace.  Once this brace is delivered and fitted appropriately, patient 

should wear this brace while sitting upright at greater than 45, during 

increase activities, during ambulation.  Brace does not have to or while lying 

in bed or while bathing.  Following fitting of this brace, patient is clear for 

discharge from an orthopedic spine standpoint.  Following discharge, patient may

follow-up with Jonnathan Berry PA-C or Dr. Jose Antonio Pond at [facility] in approximately 2-3 weeks for further evaluation.


2.  Patient will continue be seen examined by multiple other medical providers 

for treatment and evaluation in regards to her multiple other medical diagnoses 

including left breast/axillary nonhealing wound/mass concerning for malignancy, 

pneumonia, thyroid disorder, and pleural effusion.

## 2022-12-05 NOTE — P.CONS
History of Present Illness





- Reason for Consult


Consult date: 12/05/22


wound care





- History of Present Illness


This is an 89-year-old patient with a open ulceration to the left breast 

extending to the axilla.  Possible cancerous.  The patient will undergo a 

biopsy.  Patient's past medical history significant for thyroidectomy, former 

smoker.





Review Of Systems:


Constitutional: No fever, no chills, no night sweats.  No weight change.  No 

weakness, fatigue or lethargy.  No daytime sleepiness.


Integumentary:reports wounds, no lesions.  No rash or pruritus.  No unusual 

bruising.  No change in hair or nails.





Physical exam:


General Appearance: Alert, cooperative, no distress, appears stated age.


Skin: See HPI all other Skin color, texture, tugor normal, no rashes or lesions.


Neurologic: Alert oriented x3 





Assessment:


1.  Nonhealing ulceration with fatty layer exposure possible malignancy to the 

left breast and axilla





Plan:


1.Left breast and aaxilla: Apply triad and cover with absoprtive silver. Change 

daily.





Thank you for the consultation any questions please contact the wound care 

center





DNP note has been reviewed and discussed with Dr. Epps and the impression 

and plan of care has been directed as dictated. 








Past Medical History


Past Medical History: Thyroid Disorder


History of Any Multi-Drug Resistant Organisms: None Reported


Additional Past Surgical History / Comment(s): thyroidectomy 1955


Past Anesthesia/Blood Transfusion Reactions: No Reported Reaction


Smoking Status: Former smoker


Past Alcohol Use History: Occasional


Past Drug Use History: None Reported





- Past Family History


  ** Father


Family Medical History: Myocardial Infarction (MI)





  ** Mother


Family Medical History: CVA/TIA, Myocardial Infarction (MI)





Medications and Allergies


                                Home Medications











 Medication  Instructions  Recorded  Confirmed  Type


 


Acetaminophen [Tylenol Extra 500 mg PO QID 12/02/22 12/02/22 History





Strength]    








                                    Allergies











Allergy/AdvReac Type Severity Reaction Status Date / Time


 


No Known Allergies Allergy   Verified 12/02/22 15:37














Physical Exam


Vitals: 


                                   Vital Signs











  Temp Pulse Resp BP Pulse Ox


 


 12/05/22 11:20  97.7 F  76  18  125/80  94 L


 


 12/05/22 09:50  97.1 F L  82  16  147/83  93 L


 


 12/05/22 03:52  97.7 F  84  16  144/85  93 L


 


 12/04/22 23:13  97.3 F L  84  16  116/72  95


 


 12/04/22 20:00  97.6 F  78  18  119/77  95


 


 12/04/22 15:46  97.3 F L  79  16  142/55  94 L








                                Intake and Output











 12/04/22 12/05/22 12/05/22





 22:59 06:59 14:59


 


Intake Total 481  120


 


Output Total 575 125 650


 


Balance -94 -125 -530


 


Intake:   


 


  IV 5  


 


    Invasive Line 2 5  


 


  Oral 476  120


 


Output:   


 


  Urine 575 125 650


 


Other:   


 


  Voiding Method Indwelling Catheter Indwelling Catheter Indwelling Catheter














Results


CBC & Chem 7: 


                                 12/05/22 11:06





                                 12/05/22 11:06


Labs: 


                  Abnormal Lab Results - Last 24 Hours (Table)











  12/05/22 12/05/22 Range/Units





  11:06 11:06 


 


RBC  3.62 L   (3.80-5.40)  m/uL


 


MCV  103.8 H   (80.0-100.0)  fL


 


Lymphocytes #  0.6 L   (1.0-4.8)  k/uL


 


Sodium   136 L  (137-145)  mmol/L


 


Glucose   116 H  (74-99)  mg/dL


 


Calcium   7.8 L  (8.4-10.2)  mg/dL








                      Microbiology - Last 24 Hours (Table)











 12/02/22 15:30 Blood Culture Gram Stain - Preliminary





 Blood Blood Culture - Preliminary





    Diphtheroid species


 


 12/02/22 15:15 Blood Culture Gram Stain - Final





 Blood Blood Culture - Final





    Coagulase Negative Staph














Assessment and Plan


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


Current Visit: Yes   Status: Acute   Code(s): L98.492 - NON-PRS CHRONIC ULCER OF

SKIN OF SITES W FAT LAYER EXPOSED   SNOMED Code(s): 98957848


   





(2) Wound of left breast


Current Visit: Yes   Status: Acute   Code(s): S21.002A - UNSPECIFIED OPEN WOUND 

OF LEFT BREAST, INITIAL ENCOUNTER   SNOMED Code(s): 75197176

## 2022-12-05 NOTE — PN
PROGRESS NOTE



DATE OF SERVICE:  12/04/2022



SUBJECTIVE:

This 89-year-old woman was admitted with significant indurated left breast mass and

ulceration, also had multiple compression fractures.  Multiple consultants following

the patient closely.  The blood pressure is low yesterday.  The patient was closely

monitored in telemetry.  Biopsy of the chronic wound was recommended by Infectious

Disease.  The patient is on empiric antibiotics.



PAST MEDICAL HISTORY:

Reviewed.



REVIEW OF SYSTEMS:

Fourteen-point review is negative as mentioned earlier.



CURRENT MEDICATIONS:

Heparin, Zosyn doses and rest of medication noted.



PHYSICAL EXAMINATION:

VITAL SIGNS:  Pulse is 78, blood pressure _____, respirations 16.

HEENT:  Conjunctivae normal.

CARDIOVASCULAR:  S1 and S2.

RESPIRATIONS:  A few scattered rhonchi.

ABDOMEN:  Soft.

NERVOUS SYSTEM:  No focal deficits.



LABORATORY DATA:

Blood cultures, coag-negative staph; and wound culture, gram-negative bacilli.

Otherwise, rest of the labs are reviewed.



ASSESSMENT:

1. Left breast chronic wound, possibly ulceration with cellulitis with underlying

    induration, infection, cellulitis, rule out malignancy.

2. Fall and multiple thoracic compression fractures.

3. New onset atrial fibrillation.

4. Hypothyroidism.

5. Multiple medical issues.

6. History of noncompliance.



RECOMMENDATIONS:

Recommend to continue current medications and symptomatic treatment.  Continue

antibiotics, symptomatic treatment of the pain.  Closely follow with multiple

consultants.  Hematology/Oncology evaluation is pending at this time.  We will also a

surgical consultation for possible biopsy and monitoring also.  Further recommendations

to follow.





MMMIKELL / TLN: [id number] / Job#: [id number]

## 2022-12-05 NOTE — P.GSCN
History of Present Illness


Consult date: 12/05/22


History of present illness: 





CHIEF COMPLAINT: Left breast wound





HISTORY OF PRESENT ILLNESS: This is a 89-year-old female who presented to the 

hospital with complaints of left breast wound with drainage and odor.  Patient 

reports that the wound has been there for several months to a year.  And within 

the last month to have been increased drainage and odor.  She does report 

stinging sensation.  She denies any weight loss.  She's afebrile.  She does 

report that her daughter has a history of breast cancer and had a double 

mastectomy.  Patient has never had a mammogram or breast ultrasound.  Oncology 

and wound care service has been consulted.  Patient is also being seen by spinal

service regarding multiple compression fractures of the thoracic and lumbar 

spine.  They've ordered a back brace.  Surgical service has been consulted for 

biopsy of the left breast.





PAST MEDICAL HISTORY: 


See below





PAST SURGICAL HISTORY: 


See below





MEDICATIONS: 


See below





ALLERGIES: 


See below





SOCIAL HISTORY: No illicit drug use.  





REVIEW OF SYSTEMS: 


CONSTITUTIONAL: Denies fever or chills.


HEENT: Denies blurred vision, vision changes, or eye pain. Denies hemoptysis 


CARDIOVASCULAR: Denies chest pain or pressure.


RESPIRATORY: No shortness of breath. 


GASTROINTESTINAL: See HPI for pertinent findings


HEMATOLOGIC: Denies bleeding disorders.


GENITOURINARY:  Denies any blood in urine or increased urinary frequency.  


SKIN: Denies pruitis. Denies rash.





PHYSICAL EXAM: 


VITAL SIGNS: Reviewed


GENERAL: Well-developed in no acute distress. 


HEENT:  No sclera icterus. Extraocular movements grossly intact.  Moist buccal 

mucosa. Head is atraumatic, normocephalic. No nasal drainage.


ABDOMEN:  Soft.  Nondistended.  Nontender


NEUROLOGIC:  Alert and oriented.  Cranial nerves II through XII grossly intact.


Breast: Large left breast wound on the lateral aspect of the breast into the 

axilla area.  Follow odor.  Purulent drainage.  Scabbing and skin breakdown 

noted.  Firm and fixed supraclavicular and axillary lymph nodes





LABORATORY DATA:


WBC is 5.3 Hgb 11.9 platelets 322


Na 136 potassium 4.6 creatinine 0.63


Positive blood culture likely skin contamination


Wound culture gram-negative bacilli





IMAGING:


Computed tomography scan chest abdomen and pelvis thoracic and lumbar mild 

compression fractures could be acute.  Rectal fecal impaction.  Consolidation 

and atelectasis left lower lobe with left pleural effusion.  Mild cardiomegaly.





Computed tomography scan of the brain cerebral atrophy and chronic small vessel 

ischemia.  No acute intracranial abnormality.  No cervical fracture seen.





ASSESSMENT: 


1.  Left breast wound high suspicion of malignancy





PLAN: 


-Further recommendations forthcoming per surgeon regarding biopsy


-Agree with oncology consults


-Continue with local wound care





Thank you for this consultation





Physician Assistant note has been reviewed by physician. Signing provider agrees

with the documented findings, assessment, and plan of care. 





Past Medical History


Past Medical History: Thyroid Disorder


History of Any Multi-Drug Resistant Organisms: None Reported


Additional Past Surgical History / Comment(s): thyroidectomy 1955


Past Anesthesia/Blood Transfusion Reactions: No Reported Reaction


Smoking Status: Former smoker


Past Alcohol Use History: Occasional


Past Drug Use History: None Reported





- Past Family History


  ** Father


Family Medical History: Myocardial Infarction (MI)





  ** Mother


Family Medical History: CVA/TIA, Myocardial Infarction (MI)





Medications and Allergies


                                Home Medications











 Medication  Instructions  Recorded  Confirmed  Type


 


Acetaminophen [Tylenol Extra 500 mg PO QID 12/02/22 12/02/22 History





Strength]    








                                    Allergies











Allergy/AdvReac Type Severity Reaction Status Date / Time


 


No Known Allergies Allergy   Verified 12/02/22 15:37














Surgical - Exam


                                   Vital Signs











Temp Pulse Resp BP Pulse Ox


 


 97.8 F   60   18   104/72   95 


 


 12/02/22 14:44  12/02/22 14:44  12/02/22 14:44  12/02/22 14:44  12/02/22 14:44














Results





- Labs





                                 12/05/22 11:06





                                 12/05/22 11:06


                      Microbiology - Last 24 Hours (Table)











 12/02/22 15:30 Blood Culture Gram Stain - Preliminary





 Blood Blood Culture - Preliminary





    Diphtheroid species


 


 12/02/22 15:15 Blood Culture Gram Stain - Final





 Blood Blood Culture - Final





    Coagulase Negative Staph

## 2022-12-05 NOTE — P.PN
Subjective


Progress Note Date: 12/05/22


Principal diagnosis: 





fungating chest mass, LUE lymphedema





In f/u today pt is eating her lunch.  Her LUE is swollen and hard to use, she 

denies any significant pain in the lt chest wall.  She is reporting that the LAD

in her lt neck just came on recently and that the chest mass has been there for 

several YEARS.





Objective





- Vital Signs


Vital signs: 


                                   Vital Signs











Temp  97.7 F   12/05/22 11:20


 


Pulse  76   12/05/22 11:20


 


Resp  18   12/05/22 11:20


 


BP  125/80   12/05/22 11:20


 


Pulse Ox  94 L  12/05/22 11:20


 


FiO2      








                                 Intake & Output











 12/04/22 12/05/22 12/05/22





 18:59 06:59 18:59


 


Intake Total 1322 240 240


 


Output Total 1675 125 650


 


Balance -353 115 -410


 


Intake:   


 


  IV 10  


 


    Invasive Line 2 10  


 


  Oral 1312 240 240


 


Output:   


 


  Urine 1675 125 650


 


Other:   


 


  Voiding Method Indwelling Catheter Indwelling Catheter Indwelling Catheter














- Constitutional


General appearance: Present: average body habitus, cooperative, no acute d

istress





- EENT


Eyes: Present: anicteric sclerae, EOMI


ENT: Present: hearing grossly normal





- Neck


Neck: Present: lymphadenopathy (hard, fixed, left neck, feels like several 

nodes)





- Respiratory


Details: 





congested cough, rales, clear somewhat after cough





- Cardiovascular


Rhythm: regular (radial, 2+)





- Integumentary


Integumentary Comment(s): 





left chest wall, axilla fungating mass noted





- Neurologic


Neurologic: Present: CNII-XII intact (grossly)





- Musculoskeletal


Musculoskeletal: Present: generalized weakness





- Psychiatric


Psychiatric: Present: A&O x's 3, appropriate affect, intact judgment & insight





- Labs


CBC & Chem 7: 


                                 12/05/22 11:06





                                 12/05/22 11:06


Labs: 


                  Abnormal Lab Results - Last 24 Hours (Table)











  12/05/22 12/05/22 Range/Units





  11:06 11:06 


 


RBC  3.62 L   (3.80-5.40)  m/uL


 


MCV  103.8 H   (80.0-100.0)  fL


 


Lymphocytes #  0.6 L   (1.0-4.8)  k/uL


 


Sodium   136 L  (137-145)  mmol/L


 


Glucose   116 H  (74-99)  mg/dL


 


Calcium   7.8 L  (8.4-10.2)  mg/dL








                      Microbiology - Last 24 Hours (Table)











 12/02/22 15:30 Blood Culture Gram Stain - Preliminary





 Blood Blood Culture - Preliminary





    Diphtheroid species


 


 12/02/22 15:15 Blood Culture Gram Stain - Final





 Blood Blood Culture - Final





    Coagulase Negative Staph














Assessment and Plan


(1) Lumbar compression fracture


Current Visit: Yes   Status: Acute   Priority: High   Code(s): S32.000A - WEDGE 

COMPRESSION FRACTURE OF UNSP LUMBAR VERTEBRA, INIT   SNOMED Code(s): 682377125


   





(2) Wound of left breast


Current Visit: Yes   Status: Acute   Priority: High   Code(s): S21.002A - 

UNSPECIFIED OPEN WOUND OF LEFT BREAST, INITIAL ENCOUNTER   SNOMED Code(s): 

82348564


   


Plan: 





NM bone scan ordered to further assess suspected compression fractures to 

evaluate if they are metastatic disease


Pt is agreeable to biopsy, pending Surgical consult.  We discussed that highly 

likely this is breast cancer.  It is most likely slower growing based on her 

report that it has been present for several years.  It is reasonable to biopsy 

and obtain hormone receptor status.  If positive, pt could qualify for AI 

therapy, which could control the disease for some time without serious side 

effects and good overall tolerability.  It could alleviate some symptoms. 


Discussed case briefly with wound care.  Asked if the LUE could be gently 

wrapped to aid in reducing some of the lymphedema.

## 2022-12-06 LAB
ANION GAP SERPL CALC-SCNC: 3 MMOL/L
BASOPHILS # BLD AUTO: 0 K/UL (ref 0–0.2)
BASOPHILS NFR BLD AUTO: 1 %
BUN SERPL-SCNC: 13 MG/DL (ref 7–17)
CALCIUM SPEC-MCNC: 8 MG/DL (ref 8.4–10.2)
CHLORIDE SERPL-SCNC: 107 MMOL/L (ref 98–107)
CO2 SERPL-SCNC: 27 MMOL/L (ref 22–30)
EOSINOPHIL # BLD AUTO: 0.2 K/UL (ref 0–0.7)
EOSINOPHIL NFR BLD AUTO: 3 %
ERYTHROCYTE [DISTWIDTH] IN BLOOD BY AUTOMATED COUNT: 3.65 M/UL (ref 3.8–5.4)
ERYTHROCYTE [DISTWIDTH] IN BLOOD: 14.6 % (ref 11.5–15.5)
GLUCOSE SERPL-MCNC: 87 MG/DL (ref 74–99)
HCT VFR BLD AUTO: 38 % (ref 34–46)
HGB BLD-MCNC: 11.9 GM/DL (ref 11.4–16)
LYMPHOCYTES # SPEC AUTO: 0.9 K/UL (ref 1–4.8)
LYMPHOCYTES NFR SPEC AUTO: 17 %
MCH RBC QN AUTO: 32.7 PG (ref 25–35)
MCHC RBC AUTO-ENTMCNC: 31.4 G/DL (ref 31–37)
MCV RBC AUTO: 104.3 FL (ref 80–100)
MONOCYTES # BLD AUTO: 0.4 K/UL (ref 0–1)
MONOCYTES NFR BLD AUTO: 7 %
NEUTROPHILS # BLD AUTO: 3.9 K/UL (ref 1.3–7.7)
NEUTROPHILS NFR BLD AUTO: 69 %
PLATELET # BLD AUTO: 325 K/UL (ref 150–450)
POTASSIUM SERPL-SCNC: 4.6 MMOL/L (ref 3.5–5.1)
SODIUM SERPL-SCNC: 137 MMOL/L (ref 137–145)
WBC # BLD AUTO: 5.6 K/UL (ref 3.8–10.6)

## 2022-12-06 RX ADMIN — PIPERACILLIN AND TAZOBACTAM SCH MLS/HR: 3; .375 INJECTION, POWDER, FOR SOLUTION INTRAVENOUS at 04:09

## 2022-12-06 RX ADMIN — HEPARIN SODIUM SCH UNIT: 5000 INJECTION INTRAVENOUS; SUBCUTANEOUS at 09:39

## 2022-12-06 RX ADMIN — FOLIC ACID SCH MG: 1 TABLET ORAL at 13:56

## 2022-12-06 RX ADMIN — SENNOSIDES SCH MG: 8.6 TABLET, FILM COATED ORAL at 13:59

## 2022-12-06 RX ADMIN — PIPERACILLIN AND TAZOBACTAM SCH MLS/HR: 3; .375 INJECTION, POWDER, FOR SOLUTION INTRAVENOUS at 20:38

## 2022-12-06 RX ADMIN — Medication SCH MG: at 13:59

## 2022-12-06 RX ADMIN — PIPERACILLIN AND TAZOBACTAM SCH MLS/HR: 3; .375 INJECTION, POWDER, FOR SOLUTION INTRAVENOUS at 13:56

## 2022-12-06 RX ADMIN — THERA TABS SCH EACH: TAB at 13:56

## 2022-12-06 RX ADMIN — Medication SCH APPLIC: at 09:39

## 2022-12-06 RX ADMIN — SENNOSIDES SCH MG: 8.6 TABLET, FILM COATED ORAL at 20:38

## 2022-12-06 RX ADMIN — HEPARIN SODIUM SCH UNIT: 5000 INJECTION INTRAVENOUS; SUBCUTANEOUS at 20:38

## 2022-12-06 NOTE — P.PN
Subjective


Progress Note Date: 12/06/22





CHIEF COMPLAINT: Left breast mass





HISTORY OF PRESENT ILLNESS: Patient is sitting up at bedside chair.  She reports

her pain is controlled.  She scheduled for bone scan today.  She has been 

evaluated by oncology.  She also has a patch brace for her spinal fractures.  

Afebrile.  WBC 5.6 Hgb 11.9 platelets 325 Na137 potassium 4.6 creatinine 0.64





Patient seen and examined with Dr. Alvarez





PHYSICAL EXAM: 


VITAL SIGNS: Reviewed.


GENERAL: Well-developed in no acute distress. 


HEENT:  No sclera icterus. Extraocular movements grossly intact.  Moist buccal 

mucosa. Head is atraumatic, normocephalic. 


ABDOMEN:  Soft.  Nondistended. Nontender. 


NEUROLOGIC: Alert and oriented. Cranial nerves II through XII grossly intact.


Breast: Large left breast wound on the lateral aspect of the breast into the 

axilla area.  Foul odor.  Purulent drainage.  Scabbing and skin breakdown noted.

 Firm and fixed supraclavicular and axillary lymph nodes





ASSESSMENT: 


1.  Left breast mass with high suspicion of malignancy








PLAN: 


-Patient scheduled for left breast mass biopsy tomorrow at bedside with Dr. alvarez


-Continue oncology workup


-Continue supportive care


-Continue local wound care





Physician Assistant note has been reviewed by physician. Signing provider agrees

with the documented findings, assessment, and plan of care. 





Objective





- Vital Signs


Vital signs: 


                                   Vital Signs











Temp  97.8 F   12/06/22 09:34


 


Pulse  77   12/06/22 09:34


 


Resp  15   12/06/22 09:34


 


BP  133/73   12/06/22 09:34


 


Pulse Ox  94 L  12/06/22 09:34


 


FiO2      








                                 Intake & Output











 12/05/22 12/06/22 12/06/22





 18:59 06:59 18:59


 


Intake Total 600 240 80


 


Output Total 650 1250 


 


Balance -50 -1010 80


 


Intake:   


 


  Oral 600 240 80


 


Output:   


 


  Urine 650 1250 


 


Other:   


 


  Voiding Method Indwelling Catheter Indwelling Catheter 














- Labs


CBC & Chem 7: 


                                 12/06/22 07:11





                                 12/06/22 07:11


Labs: 


                  Abnormal Lab Results - Last 24 Hours (Table)











  12/05/22 12/05/22 12/06/22 Range/Units





  11:06 11:06 07:11 


 


RBC  3.62 L   3.65 L  (3.80-5.40)  m/uL


 


MCV  103.8 H   104.3 H  (80.0-100.0)  fL


 


Lymphocytes #  0.6 L   0.9 L  (1.0-4.8)  k/uL


 


Sodium   136 L   (137-145)  mmol/L


 


Glucose   116 H   (74-99)  mg/dL


 


Calcium   7.8 L   (8.4-10.2)  mg/dL














  12/06/22 Range/Units





  07:11 


 


RBC   (3.80-5.40)  m/uL


 


MCV   (80.0-100.0)  fL


 


Lymphocytes #   (1.0-4.8)  k/uL


 


Sodium   (137-145)  mmol/L


 


Glucose   (74-99)  mg/dL


 


Calcium  8.0 L  (8.4-10.2)  mg/dL








                      Microbiology - Last 24 Hours (Table)











 12/02/22 15:53 Anaerobic Culture - Final





 Axilla - Left    Anaerobic Gm Negative Bacilli


 


 12/02/22 15:53 Gram Stain - Final





 Axilla - Left Wound Culture - Final





    Acinetobacter thompson/haemol





    Enterobacter cloacae


 


 12/02/22 15:30 Blood Culture Gram Stain - Preliminary





 Blood Blood Culture - Preliminary





    Diphtheroid species

## 2022-12-06 NOTE — P.PN
Progress Note - Text


Progress Note Date: 12/06/22





Orthopedic spine:





History of present illness:





Patient is a very pleasant 89-year-old female who is seen and examined at 

bedside for follow up evaluation of her thoracolumbar spine.  She is known to 

have compression fracture deformities of T12, L1, and L2 with likely acute 

compression fracture deformity of L1.  Currently, her thoracolumbar pain is 

fairly well controlled.  She is resting in bedside chair comfortably.  She 

states she does have some pain towards her lumbosacral spine.  Since being seen 

and examined yesterday, a high-back LSO bracing is delivered and fitted 

appropriately.  She currently has his brace intact.  The brace does fit 

comfortably.  She is not currently complaining of significant pain at her 

thoracolumbar spine.  Patient denies any lower extremity weakness or 

radiculopathy.





Patient is currently being seen by multiple other medical providers.  She does 

have some breast changes and left lower extremity cellulitis.  She is currently 

on antibiotics per infectious disease.  Patient states they are planning for 

biopsy of left breast mass, which is concerning for malignancy.  She is being 

seen by oncology.  She is also being treated for pneumonia, thyroid disorder and

pleural effusion.








Physical exam:





Patient is awake, alert, and oriented 3


Vital signs stable


Good chest excursion with deep inspiration and expiration


Patient currently sitting comfortably in a bedside chair with her LSO brace 

intact


Dorsiflexion, plantarflexion, and extensor hallucis longus positive sustained 

bilaterally


No signs or symptoms of DVT; no calf pain


Neurovascularly intact








Assessment:





Thoracolumbar pain


Lumbosacral pain


Status post fall


T12, L1, and L2 compression fracture deformities


L1 likely acute compression fracture deformity


Left breast/axillary nonhealing wound/mass concerning for malignancy


Pneumonia


Pleural effusion


Thyroid disorder








Plan:





1.  We will continue with our plan as set forth yesterday.  After reviewing of 

imaging, physical examination the patient, and further discussion with the 

patient, will currently planned to continue with conservative treatment at this 

time.   At this time we'll plan for bracing.  A prescription has been written 

and provided to case management for a High back LSO brace.  This brace was 

delivered and fitted appropriately yesterday.  The patient should wear this 

brace while sitting upright at greater than 45, during increase activities, 

during ambulation.  Brace does not have to or while lying in bed or while 

bathing.  Patient is clear for discharge from an orthopedic spine standpoint.  

Following discharge, patient may follow-up with Jonnathan Berry PA-C or Dr. Jose Antonio Pond at [facility] in approximately 2-3 weeks for 

further evaluation.


2.  Patient will continue be seen examined by multiple other medical providers 

for treatment and evaluation in regards to her multiple other medical diagnoses 

including left breast/axillary nonhealing wound/mass concerning for malignancy, 

pneumonia, thyroid disorder, and pleural effusion.

## 2022-12-06 NOTE — NM
EXAMINATION TYPE: NM bone scan whole body

 

DATE OF EXAM: 12/6/2022

 

COMPARISON: 12/2/2022 CT chest abdomen and pelvis

 

HISTORY: Pain

 

Delayed whole-body scanning was performed following the injection of 21.1 mCi Tc 99m MDP.  Images acq
uired 5 hours post injection.

 

FINDINGS: 

 

There is a scoliosis noted. There is increased uptake corresponding to the L1, L2 and T12 superior en
dplate fracture suggesting recent fractures.

Abnormal uptake involving the bilateral rib cage suggests previous trauma. Moderate uptake seen in th
e mid to lower thoracic spine likely is degenerative. Kyphosis of the spine noted. Faint uptake seen 
involving the hips likely postarthritic.

 

IMPRESSION:

1. Abnormal uptake T12, L1 and L2 as can concordant with the CT findings of compression fractures.

2. Findings suggest remote trauma to the rib cage correlate clinically.

3. Abnormal uptake involving the mid and lower thoracic spine and there is likely degenerative.

## 2022-12-07 PROCEDURE — 0HB5XZX EXCISION OF CHEST SKIN, EXTERNAL APPROACH, DIAGNOSTIC: ICD-10-PCS

## 2022-12-07 RX ADMIN — FOLIC ACID SCH MG: 1 TABLET ORAL at 12:25

## 2022-12-07 RX ADMIN — PIPERACILLIN AND TAZOBACTAM SCH MLS/HR: 3; .375 INJECTION, POWDER, FOR SOLUTION INTRAVENOUS at 04:06

## 2022-12-07 RX ADMIN — HEPARIN SODIUM SCH UNIT: 5000 INJECTION INTRAVENOUS; SUBCUTANEOUS at 20:46

## 2022-12-07 RX ADMIN — Medication SCH MG: at 12:25

## 2022-12-07 RX ADMIN — PIPERACILLIN AND TAZOBACTAM SCH MLS/HR: 3; .375 INJECTION, POWDER, FOR SOLUTION INTRAVENOUS at 20:47

## 2022-12-07 RX ADMIN — SENNOSIDES SCH MG: 8.6 TABLET, FILM COATED ORAL at 08:45

## 2022-12-07 RX ADMIN — HEPARIN SODIUM SCH UNIT: 5000 INJECTION INTRAVENOUS; SUBCUTANEOUS at 08:45

## 2022-12-07 RX ADMIN — Medication SCH APPLIC: at 08:45

## 2022-12-07 RX ADMIN — THERA TABS SCH EACH: TAB at 12:25

## 2022-12-07 RX ADMIN — SENNOSIDES SCH: 8.6 TABLET, FILM COATED ORAL at 20:46

## 2022-12-07 RX ADMIN — PIPERACILLIN AND TAZOBACTAM SCH MLS/HR: 3; .375 INJECTION, POWDER, FOR SOLUTION INTRAVENOUS at 12:25

## 2022-12-07 NOTE — P.PN
Subjective


Progress Note Date: 12/05/22


Principal diagnosis: 


Left breast/axillary area wound and cellulitis





Patient is 89 year old  female presenting to the hospital with chronic 

nonhealing wound to her left breast axillary area and some foul-smelling 

drainage concerning for possible infected left breast is unremarkable.  

Posterior


On today's evaluation and that is 12/5/2022 patient remains to be afebrile pa

tient is breathing comfortably denies any worsening pain to the left chest 

wall/axillary area no nausea vomiting no abdominal pain and no diarrhea





Objective





- Vital Signs


Vital signs: 


                                   Vital Signs











Temp  97.7 F   12/05/22 11:20


 


Pulse  76   12/05/22 11:20


 


Resp  18   12/05/22 11:20


 


BP  125/80   12/05/22 11:20


 


Pulse Ox  94 L  12/05/22 11:20


 


FiO2      








                                 Intake & Output











 12/04/22 12/05/22 12/05/22





 18:59 06:59 18:59


 


Intake Total 1322 240 120


 


Output Total 1675 125 650


 


Balance -353 115 -530


 


Intake:   


 


  IV 10  


 


    Invasive Line 2 10  


 


  Oral 1312 240 120


 


Output:   


 


  Urine 1675 125 650


 


Other:   


 


  Voiding Method Indwelling Catheter Indwelling Catheter Indwelling Catheter














- Exam


GENERAL DESCRIPTION: An elderly female lying in bed in no distress





RESPIRATORY SYSTEM: Unlabored breathing , decreased breath sounds at bases





HEART: S1 S2 regular rate and rhythm ,


Left breast/axillary area with wound some foul-smelling





ABDOMEN: Soft , no tenderness





EXTREMITIES: No edema feet








- Labs


CBC & Chem 7: 


                                 12/06/22 07:11





                                 12/06/22 07:11


Labs: 


                  Abnormal Lab Results - Last 24 Hours (Table)











  12/05/22 12/05/22 Range/Units





  11:06 11:06 


 


RBC  3.62 L   (3.80-5.40)  m/uL


 


MCV  103.8 H   (80.0-100.0)  fL


 


Lymphocytes #  0.6 L   (1.0-4.8)  k/uL


 


Sodium   136 L  (137-145)  mmol/L


 


Glucose   116 H  (74-99)  mg/dL


 


Calcium   7.8 L  (8.4-10.2)  mg/dL








                      Microbiology - Last 24 Hours (Table)











 12/02/22 15:30 Blood Culture Gram Stain - Preliminary





 Blood Blood Culture - Preliminary





    Diphtheroid species


 


 12/02/22 15:15 Blood Culture Gram Stain - Final





 Blood Blood Culture - Final





    Coagulase Negative Staph














Assessment and Plan


(1) Cellulitis


Current Visit: Yes   Status: Acute   Code(s): L03.90 - CELLULITIS, UNSPECIFIED  

SNOMED Code(s): 137524432


   





(2) Wound of left breast


Current Visit: Yes   Status: Acute   Priority: High   Code(s): S21.002A - 

UNSPECIFIED OPEN WOUND OF LEFT BREAST, INITIAL ENCOUNTER   SNOMED Code(s): 

21340469


   


Plan: 


1patient with a chronic nonhealing wound to the left chest wall and armpit area

with concern for possible malignancy as did have hard margins and no significant

redness some drainage and foul-smelling possible secondary bacterial infection 

not entirely excluded.


2patient will benefit from biopsy and surgical debridement 


3positive blood culture with Diphtheroid likely skin contamination no need for 

vancomycin


4-patient Local cultures growing Acinetobacter along with Enterobacter which is 

resistant to Unasyn and anaerobes


5-Patient to continue with Zosyn and local wound care per the wound care team

## 2022-12-07 NOTE — P.PN
Subjective


Progress Note Date: 12/07/22








Patient is a 89-year-old female admitted to the hospital significant indurated 

left breast mass and ulceration and had multiple compression fractures.  Patient

is being followed by ID and general surgery and is empiric antibiotics.  Bone 

biopsy was recommended by ID.


12/5/2022


Patient is currently resting in the bed.  Awake alert and oriented.  No 

complaints of worsening chest wall pain.  No complaints of shortness of breath. 

No nausea vomiting abdominal pain or diarrhea.  Left upper extremity is swollen.

 Oncology is highly suspicious for breast cancer.


Afebrile.  Laboratory data showed WBC 5.3 hemoglobin 11.9 and platelets 322


Sodium 136 potassium 4.6 chloride 106 bicarb is 27 BUN 16 and creatinine 0.60 

and calcium 7.8.


Cultures showing anaerobic gram-negative bacilli, Acinetobacter Flores and 

Enterobacter cloacae.


Patient is being continued antibiotics in the form of Zosyn.





12/6/2022


Patient is currently resting in bed.  Pain is controlled.  No complaints of 

fever or chills.  Patient is scheduled for bone marrow biopsy today.


Yesterday she was placed on back brace due to compression fractures.  Laboratory

data reviewed.  Patient is scheduled for bedside biopsy tomorrow by general [facility].





12/7/2022


Patient is lying in the bed.  Awake alert and oriented.  Patient had bone scan 

yesterday showed abnormal uptake T12 and L1 and L2 as can concordant with the CT

findings of compression fractures.  Findings suggest remote trauma to the ribs 

is correlate clinically.  Abnormal uptake involving the mid and lower thoracic 

spine and there is likely degenerative.


Patient had left breast punch biopsy at bedside by general surgery.


Patient otherwise being continued on wound care and antibiotics in the form of 

Zosyn.  ID is on board.





Current medications reviewed.








Objective





- Vital Signs


Vital signs: 


                                   Vital Signs











Temp  97.6 F   12/07/22 19:38


 


Pulse  75   12/07/22 19:38


 


Resp  16   12/07/22 19:38


 


BP  135/67   12/07/22 19:38


 


Pulse Ox  94 L  12/07/22 19:38


 


FiO2      








                                 Intake & Output











 12/07/22 12/07/22 12/08/22





 06:59 18:59 06:59


 


Intake Total 240 240 


 


Output Total 200 350 


 


Balance 40 -110 


 


Weight  72.575 kg 


 


Intake:   


 


  Oral 240 240 


 


Output:   


 


  Urine 200 350 


 


Other:   


 


  Voiding Method Indwelling Catheter Indwelling Catheter 


 


  # Bowel Movements   1














- Exam





PHYSICAL EXAMINATION: 


Patient is lying in the bed comfortably, no acute distress, awake alert and 

oriented.. 


HEENT: Normocephalic. Neck is supple. Pupils reactive. Nostrils clear. Oral 

cavity is moist. 


Neck reveals no JVD, carotid bruits, or thyromegaly. 


CHEST EXAMINATION: Trachea is central. Symmetrical expansion. Lung fields clear 

to auscultation and percussion.


 Left anterior chest wall wound and axillary area is bandaged.


CARDIAC: Normal S1, S2 with no gallops. No murmurs 


ABDOMEN: Soft. Bowel sounds normal. No organomegaly. No abdominal bruits. 


Extremities: reveal no edema.  No clubbing or cyanosis


Neurologically awake, alert, oriented x3 with well-coordinated movements.  No 

focal deficits noted


Skin: No rash or skin lesions. 


Psychiatric: Cooperative.  Nonsuicidal


Musculoskeletal: No joint swelling or deformity.  Normal range of motion.








- Labs


CBC & Chem 7: 


                                 12/06/22 07:11





                                 12/06/22 07:11





Assessment and Plan


Assessment: 





Chronic nonhealing left chest wall wound.  Suspicious for Breast Malignancy.


Lumbar compression fractures.


History of thyroidectomy


Prior history of smoking


DVT prophylaxis





Plan:


Patient does have wound on the left breast and axillary lesion highly suspicious

for malignancy and bacterial infection


Patient is status post punch biopsy of the left breast today at bedside by 

general surgery.  


Bone scan today for possible metastatic disease.


ID and oncology is on board.


Continue antibiotics in the form of Zosyn.  Follow-up closely.  Prognosis is 

guarded at this time..





Time with Patient: Greater than 30

## 2022-12-07 NOTE — P.PN
Subjective


Progress Note Date: 12/05/22








Patient is a 89-year-old female admitted to the hospital significant indurated 

left breast mass and ulceration and had multiple compression fractures.  Patient

is being followed by ID and general surgery and is empiric antibiotics.  Bone 

biopsy was recommended by ID.


12/5/2022


Patient is currently resting in the bed.  Awake alert and oriented.  No 

complaints of worsening chest wall pain.  No complaints of shortness of breath. 

No nausea vomiting abdominal pain or diarrhea.  Left upper extremity is swollen.

 Oncology is highly suspicious for breast cancer.


Afebrile.  Laboratory data showed WBC 5.3 hemoglobin 11.9 and platelets 322


Sodium 136 potassium 4.6 chloride 106 bicarb is 27 BUN 16 and creatinine 0.60 

and calcium 7.8.


Cultures showing anaerobic gram-negative bacilli, Acinetobacter Flores and 

Enterobacter cloacae.


Patient is being continued antibiotics in the form of Zosyn.





Current medications reviewed.








Objective





- Vital Signs


Vital signs: 


                                   Vital Signs











Temp  97.7 F   12/05/22 11:20


 


Pulse  76   12/05/22 11:20


 


Resp  18   12/05/22 11:20


 


BP  125/80   12/05/22 11:20


 


Pulse Ox  94 L  12/05/22 11:20


 


FiO2      








                                 Intake & Output











 12/04/22 12/05/22 12/05/22





 18:59 06:59 18:59


 


Intake Total 1322 240 240


 


Output Total 1675 125 650


 


Balance -353 115 -410


 


Intake:   


 


  IV 10  


 


    Invasive Line 2 10  


 


  Oral 1312 240 240


 


Output:   


 


  Urine 1675 125 650


 


Other:   


 


  Voiding Method Indwelling Catheter Indwelling Catheter Indwelling Catheter














- Exam





PHYSICAL EXAMINATION: 


Patient is lying in the bed comfortably, no acute distress, awake alert and 

oriented.. 


HEENT: Normocephalic. Neck is supple. Pupils reactive. Nostrils clear. Oral 

cavity is moist. 


Neck reveals no JVD, carotid bruits, or thyromegaly. 


CHEST EXAMINATION: Trachea is central. Symmetrical expansion. Lung fields clear 

to auscultation and percussion.


 Left anterior chest wall wound and axillary area is bandaged.


CARDIAC: Normal S1, S2 with no gallops. No murmurs 


ABDOMEN: Soft. Bowel sounds normal. No organomegaly. No abdominal bruits. 


Extremities: reveal no edema.  No clubbing or cyanosis


Neurologically awake, alert, oriented x3 with well-coordinated movements.  No 

focal deficits noted


Skin: No rash or skin lesions. 


Psychiatric: Cooperative.  Nonsuicidal


Musculoskeletal: No joint swelling or deformity.  Normal range of motion.








- Labs


CBC & Chem 7: 


                                 12/06/22 07:11





                                 12/06/22 07:11


Labs: 


                  Abnormal Lab Results - Last 24 Hours (Table)











  12/05/22 12/05/22 Range/Units





  11:06 11:06 


 


RBC  3.62 L   (3.80-5.40)  m/uL


 


MCV  103.8 H   (80.0-100.0)  fL


 


Lymphocytes #  0.6 L   (1.0-4.8)  k/uL


 


Sodium   136 L  (137-145)  mmol/L


 


Glucose   116 H  (74-99)  mg/dL


 


Calcium   7.8 L  (8.4-10.2)  mg/dL








                      Microbiology - Last 24 Hours (Table)











 12/02/22 15:30 Blood Culture Gram Stain - Preliminary





 Blood Blood Culture - Preliminary





    Diphtheroid species


 


 12/02/22 15:15 Blood Culture Gram Stain - Final





 Blood Blood Culture - Final





    Coagulase Negative Staph














Assessment and Plan


Assessment: 





Chronic nonhealing left chest wall wound.  Suspicious for Breast Malignancy.


Lumbar compression fractures.


History of thyroidectomy


Prior history of smoking


DVT prophylaxis





Plan:


Patient does have wound on the left breast and axillary lesion highly suspicious

for malignancy and bacterial infection


ID recommends biopsy.  General surgery was consulted.  Bone scan was also 

ordered for possible metastatic disease.


ID and oncology is on board.


Continue antibiotics in the form of Zosyn.  Follow-up closely.  Prognosis is 

guarded at this time..





Time with Patient: Greater than 30

## 2022-12-07 NOTE — P.PN
Subjective


Progress Note Date: 12/07/22





CHIEF COMPLAINT: Left breast mass





HISTORY OF PRESENT ILLNESS: Patient is sitting up at bedside chair.  She reports

her pain is controlled.  Patient had bone scan completed showing abnormal uptake

T12-L1 and L2 coronary with the CT findings of compression fractures.  Findings 

suggest remote trauma to rib cage correlate clinically.  Abnormal uptake in the 

mid and lower thoracic spine likely degenerative.  Patient had bedside left 

breast punch biopsy completed by Dr. alvarez today.  Afebrile





Patient seen and examined with Dr. Alvarez





PHYSICAL EXAM: 


VITAL SIGNS: Reviewed.


GENERAL: Well-developed in no acute distress. 


HEENT:  No sclera icterus. Extraocular movements grossly intact.  Moist buccal 

mucosa. Head is atraumatic, normocephalic. 


ABDOMEN:  Soft.  Nondistended. Nontender. 


NEUROLOGIC: Alert and oriented. Cranial nerves II through XII grossly intact.


Breast: Large left breast wound on the lateral aspect of the breast into the 

axilla area.  Foul odor.  Purulent drainage.  Scabbing and skin breakdown noted.

 Firm and fixed supraclavicular and axillary lymph nodes


Back has skin breakdown





ASSESSMENT: 


1.  Left breast mass with high suspicion of malignancy status post punch biopsy 

result








PLAN: 


-Follow up on biopsy result


-ok for discharge from surgical standpoint when medically clear


-Continue oncology workup


-Continue supportive care


-Continue local wound care


-Apply duoderm to her to left back skin break down





Physician Assistant note has been reviewed by physician. Signing provider agrees

with the documented findings, assessment, and plan of care. 





Objective





- Vital Signs


Vital signs: 


                                   Vital Signs











Temp  97.8 F   12/07/22 12:28


 


Pulse  78   12/07/22 12:28


 


Resp  16   12/07/22 12:28


 


BP  143/75   12/07/22 12:28


 


Pulse Ox  94 L  12/07/22 12:28


 


FiO2      








                                 Intake & Output











 12/06/22 12/07/22 12/07/22





 18:59 06:59 18:59


 


Intake Total 230 240 240


 


Output Total 650 200 350


 


Balance -420 40 -110


 


Intake:   


 


  Oral 230 240 240


 


Output:   


 


  Urine 650 200 350


 


Other:   


 


  Voiding Method Indwelling Catheter Indwelling Catheter 














- Labs


CBC & Chem 7: 


                                 12/06/22 07:11





                                 12/06/22 07:11


Labs: 


                      Microbiology - Last 24 Hours (Table)











 12/02/22 15:30 Blood Culture Gram Stain - Final





 Blood Blood Culture - Final





    Diphtheroid species

## 2022-12-07 NOTE — P.PN
Subjective


Progress Note Date: 12/06/22








Patient is a 89-year-old female admitted to the hospital significant indurated 

left breast mass and ulceration and had multiple compression fractures.  Patient

is being followed by ID and general surgery and is empiric antibiotics.  Bone 

biopsy was recommended by ID.


12/5/2022


Patient is currently resting in the bed.  Awake alert and oriented.  No 

complaints of worsening chest wall pain.  No complaints of shortness of breath. 

No nausea vomiting abdominal pain or diarrhea.  Left upper extremity is swollen.

 Oncology is highly suspicious for breast cancer.


Afebrile.  Laboratory data showed WBC 5.3 hemoglobin 11.9 and platelets 322


Sodium 136 potassium 4.6 chloride 106 bicarb is 27 BUN 16 and creatinine 0.60 

and calcium 7.8.


Cultures showing anaerobic gram-negative bacilli, Acinetobacter Flores and 

Enterobacter cloacae.


Patient is being continued antibiotics in the form of Zosyn.





12/6/2022


Patient is currently resting in bed.  Pain is controlled.  No complaints of 

fever or chills.  Patient is scheduled for bone marrow biopsy today.


Yesterday she was placed on back brace due to compression fractures.  Laboratory

data reviewed.  Patient is scheduled for bedside biopsy tomorrow by general s

giovany.








Current medications reviewed.








Objective





- Vital Signs


Vital signs: 


                                   Vital Signs











Temp  97.6 F   12/07/22 19:38


 


Pulse  75   12/07/22 19:38


 


Resp  16   12/07/22 19:38


 


BP  135/67   12/07/22 19:38


 


Pulse Ox  94 L  12/07/22 19:38


 


FiO2      








                                 Intake & Output











 12/07/22 12/07/22 12/08/22





 06:59 18:59 06:59


 


Intake Total 240 240 


 


Output Total 200 350 


 


Balance 40 -110 


 


Weight  72.575 kg 


 


Intake:   


 


  Oral 240 240 


 


Output:   


 


  Urine 200 350 


 


Other:   


 


  Voiding Method Indwelling Catheter Indwelling Catheter 


 


  # Bowel Movements   1














- Exam





PHYSICAL EXAMINATION: 


Patient is lying in the bed comfortably, no acute distress, awake alert and 

oriented.. 


HEENT: Normocephalic. Neck is supple. Pupils reactive. Nostrils clear. Oral 

cavity is moist. 


Neck reveals no JVD, carotid bruits, or thyromegaly. 


CHEST EXAMINATION: Trachea is central. Symmetrical expansion. Lung fields clear 

to auscultation and percussion.


 Left anterior chest wall wound and axillary area is bandaged.


CARDIAC: Normal S1, S2 with no gallops. No murmurs 


ABDOMEN: Soft. Bowel sounds normal. No organomegaly. No abdominal bruits. 


Extremities: reveal no edema.  No clubbing or cyanosis


Neurologically awake, alert, oriented x3 with well-coordinated movements.  No 

focal deficits noted


Skin: No rash or skin lesions. 


Psychiatric: Cooperative.  Nonsuicidal


Musculoskeletal: No joint swelling or deformity.  Normal range of motion.








- Labs


CBC & Chem 7: 


                                 12/06/22 07:11





                                 12/06/22 07:11





Assessment and Plan


Assessment: 





Chronic nonhealing left chest wall wound.  Suspicious for Breast Malignancy.


Lumbar compression fractures.


History of thyroidectomy


Prior history of smoking


DVT prophylaxis





Plan:


Patient does have wound on the left breast and axillary lesion highly suspicious

for malignancy and bacterial infection


ID recommends biopsy.  General surgery was consulted.  


Bone scan today for possible metastatic disease.


ID and oncology is on board.


Continue antibiotics in the form of Zosyn.  Follow-up closely.  Prognosis is 

guarded at this time..





Time with Patient: Greater than 30

## 2022-12-07 NOTE — P.PN
Subjective


Progress Note Date: 12/06/22


Principal diagnosis: 


Left breast/axillary area wound and cellulitis





Patient is 89 year old  female presenting to the hospital with chronic 

nonhealing wound to her left breast axillary area and some foul-smelling 

drainage concerning for possible infected left breast is unremarkable.  

Posterior


On today's evaluation and that is 12/6/2022 the patient denies any fever or any 

chills, the patient is breathing comfortably on room air patient pain to the 

left chest wall breast area wound is currently controlled denies any nausea 

vomiting no abdominal pain or diarrhea





Objective





- Vital Signs


Vital signs: 


                                   Vital Signs











Temp  97.8 F   12/06/22 09:34


 


Pulse  77   12/06/22 09:34


 


Resp  15   12/06/22 09:34


 


BP  133/73   12/06/22 09:34


 


Pulse Ox  94 L  12/06/22 09:34


 


FiO2      








                                 Intake & Output











 12/05/22 12/06/22 12/06/22





 18:59 06:59 18:59


 


Intake Total 600 240 80


 


Output Total 650 1250 


 


Balance -50 -1010 80


 


Intake:   


 


  Oral 600 240 80


 


Output:   


 


  Urine 650 1250 


 


Other:   


 


  Voiding Method Indwelling Catheter Indwelling Catheter 














- Exam


GENERAL DESCRIPTION: An elderly female lying in bed in no distress





RESPIRATORY SYSTEM: Unlabored breathing , decreased breath sounds at bases





HEART: S1 S2 regular rate and rhythm ,


Left breast/axillary area with wound some foul-smelling





ABDOMEN: Soft , no tenderness





EXTREMITIES: No edema feet








- Labs


CBC & Chem 7: 


                                 12/06/22 07:11





                                 12/06/22 07:11


Labs: 


                  Abnormal Lab Results - Last 24 Hours (Table)











  12/06/22 12/06/22 Range/Units





  07:11 07:11 


 


RBC  3.65 L   (3.80-5.40)  m/uL


 


MCV  104.3 H   (80.0-100.0)  fL


 


Lymphocytes #  0.9 L   (1.0-4.8)  k/uL


 


Calcium   8.0 L  (8.4-10.2)  mg/dL








                      Microbiology - Last 24 Hours (Table)











 12/02/22 15:30 Blood Culture Gram Stain - Final





 Blood Blood Culture - Final





    Diphtheroid species


 


 12/02/22 15:53 Anaerobic Culture - Final





 Axilla - Left    Anaerobic Gm Negative Bacilli


 


 12/02/22 15:53 Gram Stain - Final





 Axilla - Left Wound Culture - Final





    Acinetobacter thompson/haemol





    Enterobacter cloacae














Assessment and Plan


(1) Cellulitis


Current Visit: Yes   Status: Acute   Code(s): L03.90 - CELLULITIS, UNSPECIFIED  

SNOMED Code(s): 905061793


   





(2) Wound of left breast


Current Visit: Yes   Status: Acute   Priority: High   Code(s): S21.002A - 

UNSPECIFIED OPEN WOUND OF LEFT BREAST, INITIAL ENCOUNTER   SNOMED Code(s): 

28989085


   


Plan: 


1patient with a chronic nonhealing wound to the left chest wall and armpit area

with concern for possible malignancy as did have hard margins and no significant

redness some drainage and foul-smelling possible secondary bacterial infection 

not entirely excluded.


2patient Is scheduled for biopsy of the left breast mass per surgery for 

tomorrow morning


3positive blood culture with Diphtheroid likely skin contamination no need for 

vancomycin


4-patient Local cultures growing Acinetobacter along with Enterobacter which is 

resistant to Unasyn and anaerobes


5-Patient will continue with Zosyn while inpatient and hopefully transition to 

oral antibiotic on discharge


Time with Patient: Less than 30

## 2022-12-08 LAB
ANION GAP SERPL CALC-SCNC: 4 MMOL/L
BASOPHILS # BLD AUTO: 0.1 K/UL (ref 0–0.2)
BASOPHILS NFR BLD AUTO: 1 %
BUN SERPL-SCNC: 11 MG/DL (ref 7–17)
CALCIUM SPEC-MCNC: 8.3 MG/DL (ref 8.4–10.2)
CHLORIDE SERPL-SCNC: 106 MMOL/L (ref 98–107)
CO2 SERPL-SCNC: 27 MMOL/L (ref 22–30)
EOSINOPHIL # BLD AUTO: 0.3 K/UL (ref 0–0.7)
EOSINOPHIL NFR BLD AUTO: 5 %
ERYTHROCYTE [DISTWIDTH] IN BLOOD BY AUTOMATED COUNT: 3.73 M/UL (ref 3.8–5.4)
ERYTHROCYTE [DISTWIDTH] IN BLOOD: 14.6 % (ref 11.5–15.5)
GLUCOSE SERPL-MCNC: 77 MG/DL (ref 74–99)
HCT VFR BLD AUTO: 39 % (ref 34–46)
HGB BLD-MCNC: 12.3 GM/DL (ref 11.4–16)
LYMPHOCYTES # SPEC AUTO: 0.8 K/UL (ref 1–4.8)
LYMPHOCYTES NFR SPEC AUTO: 14 %
MCH RBC QN AUTO: 33 PG (ref 25–35)
MCHC RBC AUTO-ENTMCNC: 31.5 G/DL (ref 31–37)
MCV RBC AUTO: 104.6 FL (ref 80–100)
MONOCYTES # BLD AUTO: 0.5 K/UL (ref 0–1)
MONOCYTES NFR BLD AUTO: 9 %
NEUTROPHILS # BLD AUTO: 3.8 K/UL (ref 1.3–7.7)
NEUTROPHILS NFR BLD AUTO: 68 %
PLATELET # BLD AUTO: 303 K/UL (ref 150–450)
POTASSIUM SERPL-SCNC: 4.3 MMOL/L (ref 3.5–5.1)
SODIUM SERPL-SCNC: 137 MMOL/L (ref 137–145)
WBC # BLD AUTO: 5.6 K/UL (ref 3.8–10.6)

## 2022-12-08 RX ADMIN — SENNOSIDES SCH: 8.6 TABLET, FILM COATED ORAL at 20:33

## 2022-12-08 RX ADMIN — HEPARIN SODIUM SCH UNIT: 5000 INJECTION INTRAVENOUS; SUBCUTANEOUS at 20:37

## 2022-12-08 RX ADMIN — PIPERACILLIN AND TAZOBACTAM SCH MLS/HR: 3; .375 INJECTION, POWDER, FOR SOLUTION INTRAVENOUS at 20:36

## 2022-12-08 RX ADMIN — Medication SCH APPLIC: at 13:54

## 2022-12-08 RX ADMIN — SENNOSIDES SCH: 8.6 TABLET, FILM COATED ORAL at 09:49

## 2022-12-08 RX ADMIN — FOLIC ACID SCH MG: 1 TABLET ORAL at 13:53

## 2022-12-08 RX ADMIN — PIPERACILLIN AND TAZOBACTAM SCH MLS/HR: 3; .375 INJECTION, POWDER, FOR SOLUTION INTRAVENOUS at 04:16

## 2022-12-08 RX ADMIN — Medication SCH MG: at 13:54

## 2022-12-08 RX ADMIN — HEPARIN SODIUM SCH: 5000 INJECTION INTRAVENOUS; SUBCUTANEOUS at 13:48

## 2022-12-08 RX ADMIN — THERA TABS SCH EACH: TAB at 13:53

## 2022-12-08 RX ADMIN — PIPERACILLIN AND TAZOBACTAM SCH MLS/HR: 3; .375 INJECTION, POWDER, FOR SOLUTION INTRAVENOUS at 13:54

## 2022-12-08 NOTE — P.PN
Subjective


Progress Note Date: 12/08/22


Principal diagnosis: 


Left breast/axillary area wound and cellulitis





Patient is 89 year old  female presenting to the hospital with chronic 

nonhealing wound to her left breast axillary area and some foul-smelling 

drainage concerning for possible infected left breast is unremarkable.  

Posterior


On today's evaluation and that is 12/08/2022 patient denies any fever or chills 

patient is breathing comfortably on room air patient left breast/chest wall pain

is currently controlled no nausea vomiting abdominal pain no diarrhea





Objective





- Vital Signs


Vital signs: 


                                   Vital Signs











Temp  97.9 F   12/08/22 04:11


 


Pulse  85   12/08/22 04:11


 


Resp  16   12/08/22 04:11


 


BP  136/80   12/08/22 04:11


 


Pulse Ox  93 L  12/08/22 04:11


 


FiO2      








                                 Intake & Output











 12/07/22 12/08/22 12/08/22





 18:59 06:59 18:59


 


Intake Total 240  598


 


Output Total 350 475 450


 


Balance -110 -475 148


 


Weight 72.575 kg  


 


Intake:   


 


  Oral 240  598


 


Output:   


 


  Urine 350 475 450


 


Other:   


 


  Voiding Method Indwelling Catheter Indwelling Catheter 


 


  # Bowel Movements  1 3














- Exam


GENERAL DESCRIPTION: An elderly female lying in bed in no distress





RESPIRATORY SYSTEM: Unlabored breathing , decreased breath sounds at bases





HEART: S1 S2 regular rate and rhythm ,


Left breast/axillary area with wound some foul-smelling





ABDOMEN: Soft , no tenderness





EXTREMITIES: No edema feet








- Labs


CBC & Chem 7: 


                                 12/08/22 06:41





                                 12/08/22 06:41


Labs: 


                  Abnormal Lab Results - Last 24 Hours (Table)











  12/08/22 12/08/22 Range/Units





  06:41 06:41 


 


RBC  3.73 L   (3.80-5.40)  m/uL


 


MCV  104.6 H   (80.0-100.0)  fL


 


Lymphocytes #  0.8 L   (1.0-4.8)  k/uL


 


Calcium   8.3 L  (8.4-10.2)  mg/dL














Assessment and Plan


(1) Cellulitis


Current Visit: Yes   Status: Acute   Code(s): L03.90 - CELLULITIS, UNSPECIFIED  

SNOMED Code(s): 525413953


   





(2) Wound of left breast


Current Visit: Yes   Status: Acute   Priority: High   Code(s): S21.002A - 

UNSPECIFIED OPEN WOUND OF LEFT BREAST, INITIAL ENCOUNTER   SNOMED Code(s): 

23434017


   


Plan: 


1patient with a chronic nonhealing wound to the left chest wall and armpit area

with concern for possible malignancy as did have hard margins and no significant

redness some drainage and foul-smelling possible secondary bacterial infection 

not entirely excluded.


2Patient is status post a biopsy of the left breast mass completed on 12/7/2022

with the patient has tolerated


3positive blood culture with Diphtheroid likely skin contamination no need for 

vancomycin


4-patient Local cultures growing Acinetobacter along with Enterobacter which is 

resistant to Unasyn and anaerobes


5-Patient will continue with Zosyn while inpatient However plan is to finish 

therapy with oral Cipro and Flagyl x10 days on discharge and this was discussed 

with the NP for admitting team 


Time with Patient: Less than 30

## 2022-12-08 NOTE — P.PN
Subjective


Progress Note Date: 12/08/22





CHIEF COMPLAINT: Left breast mass





HISTORY OF PRESENT ILLNESS: Patient is sitting in bed.  She reports her pain is 

controlled.  Patient is status post left breast punch biopsy at the bedside with

Dr. Hanson on 12/7/22.  Patient had mild bleeding from biopsy sites.  

Afebrile.  WBC 5.6 HgB 12.3 platelets 303 sons 137 potassium 4.3 creatinine 0.62





Patient seen and examined with Dr. Hanson





PHYSICAL EXAM: 


VITAL SIGNS: Reviewed.


GENERAL: Well-developed in no acute distress. 


HEENT:  No sclera icterus. Extraocular movements grossly intact.  Moist buccal 

mucosa. Head is atraumatic, normocephalic. 


ABDOMEN:  Soft.  Nondistended. Nontender. 


NEUROLOGIC: Alert and oriented. Cranial nerves II through XII grossly intact.


Breast: Large left breast wound on the lateral aspect of the breast into the 

axilla area.  Foul odor.  Purulent drainage.  Scabbing and skin breakdown noted.

 Minimal bleeding noted on dressing.


Back has skin breakdown





ASSESSMENT: 


1.  Left breast mass with high suspicion of malignancy status post punch biopsy 

result








PLAN: 


-Follow up on biopsy result


-ok for discharge from surgical standpoint when medically clear


-Continue oncology workup


-Continue supportive care


-Continue local wound care


-Apply duoderm to her to left back skin break down





Physician Assistant note has been reviewed by physician. Signing provider agrees

with the documented findings, assessment, and plan of care. 





Objective





- Vital Signs


Vital signs: 


                                   Vital Signs











Temp  97.9 F   12/08/22 04:11


 


Pulse  85   12/08/22 04:11


 


Resp  16   12/08/22 04:11


 


BP  136/80   12/08/22 04:11


 


Pulse Ox  93 L  12/08/22 04:11


 


FiO2      








                                 Intake & Output











 12/07/22 12/08/22 12/08/22





 18:59 06:59 18:59


 


Intake Total 240  240


 


Output Total 350 475 450


 


Balance -110 -475 -210


 


Weight 72.575 kg  


 


Intake:   


 


  Oral 240  240


 


Output:   


 


  Urine 350 475 450


 


Other:   


 


  Voiding Method Indwelling Catheter Indwelling Catheter 


 


  # Bowel Movements  1 3














- Labs


CBC & Chem 7: 


                                 12/08/22 06:41





                                 12/08/22 06:41


Labs: 


                  Abnormal Lab Results - Last 24 Hours (Table)











  12/08/22 12/08/22 Range/Units





  06:41 06:41 


 


RBC  3.73 L   (3.80-5.40)  m/uL


 


MCV  104.6 H   (80.0-100.0)  fL


 


Lymphocytes #  0.8 L   (1.0-4.8)  k/uL


 


Calcium   8.3 L  (8.4-10.2)  mg/dL

## 2022-12-08 NOTE — P.PN
Subjective


Progress Note Date: 12/08/22


Principal diagnosis: 





fungating chest mass, LUE lymphedema





In f/u today pt is resting comfortably, persistent LUE swelling, no pain after 

biopsy. No other c/o on a 10 point ROS 





Objective





- Vital Signs


Vital signs: 


                                   Vital Signs











Temp  97.9 F   12/08/22 04:11


 


Pulse  85   12/08/22 04:11


 


Resp  16   12/08/22 04:11


 


BP  136/80   12/08/22 04:11


 


Pulse Ox  93 L  12/08/22 04:11


 


FiO2      








                                 Intake & Output











 12/07/22 12/08/22 12/08/22





 18:59 06:59 18:59


 


Intake Total 240  240


 


Output Total 350 475 450


 


Balance -110 -475 -210


 


Weight 72.575 kg  


 


Intake:   


 


  Oral 240  240


 


Output:   


 


  Urine 350 475 450


 


Other:   


 


  Voiding Method Indwelling Catheter Indwelling Catheter 


 


  # Bowel Movements  1 3














- Constitutional


General appearance: Present: average body habitus, cooperative, no acute 

distress





- EENT


Eyes: Present: anicteric sclerae, EOMI


ENT: Present: hearing grossly normal





- Neck


Neck: Present: lymphadenopathy





- Respiratory


Respiratory: bilateral: CTA





- Cardiovascular


Rhythm: regular


Heart sounds: normal: S1, S2





- Gastrointestinal


General gastrointestinal: Present: normal bowel sounds, soft





- Integumentary


Integumentary Comment(s): 





left chest wall fungating mass, into axilla





- Neurologic


Neurologic: Present: CNII-XII intact





- Musculoskeletal


Musculoskeletal: Present: generalized weakness





- Psychiatric


Psychiatric: Present: A&O x's 3, appropriate affect, intact judgment & insight





- Labs


CBC & Chem 7: 


                                 12/08/22 06:41





                                 12/08/22 06:41


Labs: 


                  Abnormal Lab Results - Last 24 Hours (Table)











  12/08/22 12/08/22 Range/Units





  06:41 06:41 


 


RBC  3.73 L   (3.80-5.40)  m/uL


 


MCV  104.6 H   (80.0-100.0)  fL


 


Lymphocytes #  0.8 L   (1.0-4.8)  k/uL


 


Calcium   8.3 L  (8.4-10.2)  mg/dL














- Imaging and Cardiology


CT scan - abdomen: report reviewed


CT scan - chest: report reviewed


CT scan - pelvis: report reviewed





NM bone scan report reviewed





Assessment and Plan


(1) Lumbar compression fracture


Current Visit: Yes   Status: Acute   Priority: High   Code(s): S32.000A - WEDGE 

COMPRESSION FRACTURE OF UNSP LUMBAR VERTEBRA, INIT   SNOMED Code(s): 397888442


   





(2) Wound of left breast


Current Visit: Yes   Status: Acute   Priority: High   Code(s): S21.002A - 

UNSPECIFIED OPEN WOUND OF LEFT BREAST, INITIAL ENCOUNTER   SNOMED Code(s): 

45762120


   


Plan: 


Pt is s/p biopsy, no immediate complications.  Pending path


NM bone scan did not report any malignant lesions. CT CAP no evidence of 

metastatic disease .  Disease is locally advanced-involving left breast, axilla 

and there are palpable cervical LN.  Most likely breast cancer.  Based on how 

long it has been present and no evidence of metastatic disease highly suspect a 

hormone positive breast cancer.  If positive for this, pt would qualify for AI 

therapy, which could control the disease for some time without serious side 

effects and good overall tolerability.  It could alleviate some symptoms. 


Discussed case with IM NP.  Agree with discharge.  F/U with Oncologist 

scheduled.  Will review biopsy results and treatment recommendations at that 

time.  


All pt questions were answered to her satisfaction and she understands the plan.

 








 Attests:I have seen and examined pt, performed H&P, developed impression and

plan of care.  Discussed with dictator.  Agree with documentation, dictated as a

scribe.

## 2022-12-08 NOTE — P.PN
Subjective


Progress Note Date: 12/07/22


Principal diagnosis: 


Left breast/axillary area wound and cellulitis





Patient is 89 year old  female presenting to the hospital with chronic 

nonhealing wound to her left breast axillary area and some foul-smelling 

drainage concerning for possible infected left breast is unremarkable.  

Posterior


On today's evaluation and that is 12/7/2022 the patient remains to be afebrile, 

the patient is breathing comfortably on room air patient left breast/chest wall 

pain has slightly decreased intensity no nausea vomiting no abdominal pain no 

diarrhea





Objective





- Vital Signs


Vital signs: 


                                   Vital Signs











Temp  97.8 F   12/07/22 12:28


 


Pulse  78   12/07/22 12:28


 


Resp  16   12/07/22 12:28


 


BP  143/75   12/07/22 12:28


 


Pulse Ox  94 L  12/07/22 12:28


 


FiO2      








                                 Intake & Output











 12/06/22 12/07/22 12/07/22





 18:59 06:59 18:59


 


Intake Total 230 240 240


 


Output Total 650 200 350


 


Balance -420 40 -110


 


Intake:   


 


  Oral 230 240 240


 


Output:   


 


  Urine 650 200 350


 


Other:   


 


  Voiding Method Indwelling Catheter Indwelling Catheter 














- Exam


GENERAL DESCRIPTION: An elderly female lying in bed in no distress





RESPIRATORY SYSTEM: Unlabored breathing , decreased breath sounds at bases





HEART: S1 S2 regular rate and rhythm ,


Left breast/axillary area with wound some foul-smelling





ABDOMEN: Soft , no tenderness





EXTREMITIES: No edema feet








- Labs


CBC & Chem 7: 


                                 12/08/22 06:41





                                 12/08/22 06:41


Labs: 


                      Microbiology - Last 24 Hours (Table)











 12/02/22 15:30 Blood Culture Gram Stain - Final





 Blood Blood Culture - Final





    Diphtheroid species














Assessment and Plan


(1) Cellulitis


Current Visit: Yes   Status: Acute   Code(s): L03.90 - CELLULITIS, UNSPECIFIED  

SNOMED Code(s): 115923488


   





(2) Wound of left breast


Current Visit: Yes   Status: Acute   Priority: High   Code(s): S21.002A - 

UNSPECIFIED OPEN WOUND OF LEFT BREAST, INITIAL ENCOUNTER   SNOMED Code(s): 

53441720


   


Plan: 


1patient with a chronic nonhealing wound to the left chest wall and armpit area

with concern for possible malignancy as did have hard margins and no significant

redness some drainage and foul-smelling possible secondary bacterial infection 

not entirely excluded.


2patient Is scheduled for biopsy of the left breast mass per surgery this 

afternoon


3positive blood culture with Diphtheroid likely skin contamination no need for 

vancomycin


4-patient Local cultures growing Acinetobacter along with Enterobacter which is 

resistant to Unasyn and anaerobes


5-Patient will continue with Zosyn while inpatient  


Time with Patient: Less than 30

## 2022-12-09 VITALS — DIASTOLIC BLOOD PRESSURE: 82 MMHG | HEART RATE: 79 BPM | TEMPERATURE: 97.5 F | SYSTOLIC BLOOD PRESSURE: 139 MMHG

## 2022-12-09 VITALS — RESPIRATION RATE: 18 BRPM

## 2022-12-09 RX ADMIN — PIPERACILLIN AND TAZOBACTAM SCH MLS/HR: 3; .375 INJECTION, POWDER, FOR SOLUTION INTRAVENOUS at 12:52

## 2022-12-09 RX ADMIN — Medication SCH APPLIC: at 08:05

## 2022-12-09 RX ADMIN — PIPERACILLIN AND TAZOBACTAM SCH MLS/HR: 3; .375 INJECTION, POWDER, FOR SOLUTION INTRAVENOUS at 04:56

## 2022-12-09 RX ADMIN — THERA TABS SCH EACH: TAB at 12:51

## 2022-12-09 RX ADMIN — Medication SCH MG: at 12:52

## 2022-12-09 RX ADMIN — HEPARIN SODIUM SCH UNIT: 5000 INJECTION INTRAVENOUS; SUBCUTANEOUS at 08:05

## 2022-12-09 RX ADMIN — FOLIC ACID SCH MG: 1 TABLET ORAL at 12:51

## 2022-12-09 RX ADMIN — SENNOSIDES SCH: 8.6 TABLET, FILM COATED ORAL at 08:05

## 2022-12-09 NOTE — P.DS
Providers


Date of admission: 


12/02/22 19:27





Expected date of discharge: 12/09/22


Attending physician: 


Werner Foster





Consults: 





                                        





12/02/22 19:22


Consult Physician Routine 


   Consulting Provider: Grace Canas


   Consult Reason/Comments: chest wound/cellulitis


   Do you want consulting provider notified?: Yes





12/02/22 22:20


Consult Physician Routine 


   Consulting Provider: Nelson Peña


   Consult Reason/Comments: multiple compression fractures thoracic/lumbar, 

likely subacute


   Do you want consulting provider notified?: Yes, Notify in am





12/03/22 12:17


Consult Physician Routine 


   Consulting Provider: Shen Guzman


   Consult Reason/Comments: malignancy


   Do you want consulting provider notified?: Yes





12/04/22 14:34


Consult Physician Routine 


   Consulting Provider: Ray Hanson


   Consult Reason/Comments: wound biopsy??


   Do you want consulting provider notified?: Yes











Primary care physician: 


Stated None





Hospital Course: 





Final diagnosis





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 

post biopsy with results pending


Lumbar compression fractures


History of thyroidectomy


Prior history of smoking


gait dysfunction 


generalized weakness


DVT prophylaxis


No code





Discharge disposition





Patient is being discharged in a stable condition with guarded prognosis to 

[facility] for continued PT/OT therapy.   Patient will follow-up with Dr. Blank  in the outpatient setting upon discharge.  Patient is to also follow-up 

with Dr. Granger in the outpatient setting as scheduled.  Total time taken is 

greater than 35 minutes.





Hospital course


This is a 89-year-old female who was recently admitted with a left breast mass 

and ulceration with multiple compression fractures noted on exam evaluated and 

seen by general surgery along with infectious disease and oncology.  Patient had

a bedside biopsy performed by general surgery and will be following up 

outpatient with oncology for the results.  The patient is aware and 

understanding that she is not going to be receiving any type of treatments other

than possibly hormone therapy once biopsy results are provided.  Patient to 

continue with local wound care with Triad cream to the site and around it along 

with absorptive silver and ABD pad daily.  Patient did have some resistance to 

Enterobacter on the cultures along with blood cultures and was seen and 

evaluated by infectious disease maintained on Zosyn and we'll transition to 

Cipro and Flagyl for 10 days to complete the course.  Patient will need close 

outpatient follow-up at the wound center.  She was significant weakness has been

accepted at [facility] and will be going there for continued PT/OT therapy.  She 

was also seen and evaluated by orthopedics with conservative management moving 

forward and patient will be following up outpatient as needed and also has been 

using an LSO brace and will continue.  Currently no reports of chest pain, 

shortness of breath, or palpitations.  Patient is afebrile.  No reports of 

nausea or vomiting and patient is tolerating diet.  Patient will be going to 

[facility].  Guarded prognosis.





Physical exam:








Gen: This is a 89-year-old female awake, alert and oriented 3, thin built, 

elderly appearing female


HEENT: Head is atraumatic, normocephalic. Pupils equal, round. Sclerae is 

anicteric. 


NECK: Supple. No JVD. No lymphadenopathy. No thyromegaly. 


LUNGS: Meniscal breath sounds bilaterally with no wheezes or rhonchi.  No 

intercostal retractions.


HEART: Regular rate and rhythm. No murmur.  Left breast chest wall with surgical

dressing that is dry and intact post biopsy


ABDOMEN: Soft. Bowel sounds are present. No masses.  No tenderness.


EXTREMITIES: No pedal edema.  No calf tenderness.


NEUROLOGICAL: Patient is awake, alert and oriented x3. Cranial nerves 2 through 

12 are grossly intact.  Diffusely weak





Please refer to medication reconciliation sheet for a list of medications.





The impression and plan of care has been dictated by Tiana Farfan, Nurse 

Practitioner as directed.





Dr. Carito MD


I have performed a history and examination and MDM of this patient, discussed 

the same with the dictator, and  agree with the dictator's assessment and plan 

as written ,documented as a scribe. Based on total visit time,  I have performed

more than 50% of the visit.  


Patient Condition at Discharge: Stable





Plan - Discharge Summary


Discharge Rx Participant: Yes


New Discharge Prescriptions: 


New


   Ciprofloxacin HCl [Cipro] 500 mg PO BID 10 Days #20 tab


   bisacodyL [Dulcolax] 10 mg RECTAL ONCE PRN  suppositor


     PRN Reason: Constipation


   metroNIDAZOLE [Flagyl] 500 mg PO TID 10 Days #30 tab


   Folic Acid 1 mg PO DAILY@1200  tab


   Heparin Sodium,Porcine [Heparin Sodium] 5,000 unit SQ Q12HR #60 each


   polyethylene glycoL 3350 [Miralax] 17 gm PO DAILY PRN  packet


     PRN Reason: Constipation


   Multivitamins, Thera [Multivitamin (formulary)] 1 each PO DAILY@1200  tab


   Sennosides [Senokot] 8.6 mg PO BID #60 tab


   Thiamine [Vitamin B-1] 100 mg PO DAILY@1200  tab


   Melatonin 5 mg PO HS PRN #3 tablet


     PRN Reason: Insomnia





Continue


   Acetaminophen [Tylenol Extra Strength] 500 mg PO QID


Discharge Medication List





Acetaminophen [Tylenol Extra Strength] 500 mg PO QID 12/02/22 [History]


Ciprofloxacin HCl [Cipro] 500 mg PO BID 10 Days #20 tab 12/09/22 [Rx]


Folic Acid 1 mg PO DAILY@1200  tab 12/09/22 [Rx]


Heparin Sodium,Porcine [Heparin Sodium] 5,000 unit SQ Q12HR #60 each 12/09/22 

[Rx]


Melatonin 5 mg PO HS PRN #3 tablet 12/09/22 [Rx]


Multivitamins, Thera [Multivitamin (formulary)] 1 each PO DAILY@1200  tab 

12/09/22 [Rx]


Sennosides [Senokot] 8.6 mg PO BID #60 tab 12/09/22 [Rx]


Thiamine [Vitamin B-1] 100 mg PO DAILY@1200  tab 12/09/22 [Rx]


bisacodyL [Dulcolax] 10 mg RECTAL ONCE PRN  suppositor 12/09/22 [Rx]


metroNIDAZOLE [Flagyl] 500 mg PO TID 10 Days #30 tab 12/09/22 [Rx]


polyethylene glycoL 3350 [Miralax] 17 gm PO DAILY PRN  packet 12/09/22 [Rx]








Follow up Appointment(s)/Referral(s): 


Prashant Granger MD [STAFF PHYSICIAN] - 12/22/22 10:00 am


Brando Blank MD [STAFF PHYSICIAN] - 1-2 Days


Jonnathan Berry PAC [PHYSICIAN ASSISTANT] - 2 Weeks


(Patient may follow-up with Jonnathan Berry PA-C or Dr. Jose Antonio Pond at [facility] in 2-3 weeks following discharge.


)


Activity/Diet/Wound Care/Special Instructions: 


1.  Patient may wear LSO brace for comfort and support while sitting upright at 

greater than 45, while working with therapy, and while ambulating; patient does

not have to wear the brace while lying in bed or bathing


2.  Patient should avoid excessive bending, twisting, and lifting; no lifting 

greater than 10 pounds





Patient is going to [facility]


Activity as tolerated


Patient will be continued on Cipro and Flagyl as directed for the next 10 days 

per ID recommendations


Patient will need outpatient follow-up with general surgery and oncology once 

discharged from ECF





Patient is continued on a heart healthy diet dysphagia 3 chopped


Continue ensure supplements 3 times a day with meals, chocolate





Wound care includes cleansing the wound with normal saline daily of the left 

breast and axilla area and then applying Triad cream and cover with absorptive 

silver and change daily, okay to change if dressing is soiled





Discharge Disposition: TRANSFER TO SNF/ECF

## 2022-12-09 NOTE — P.PN
Subjective


Progress Note Date: 12/09/22





CHIEF COMPLAINT: Left breast mass





HISTORY OF PRESENT ILLNESS: Patient is sitting in bed.  She reports her pain is 

controlled.  Patient is status post left breast punch biopsy at the bedside with

Dr. Hanson on 12/7/22.  Biopsy is pending. Afebrile.  Patient is being 

discharged today to [facility].





Patient seen and examined with Dr. Hanson





PHYSICAL EXAM: 


VITAL SIGNS: Reviewed.


GENERAL: Well-developed in no acute distress. 


HEENT:  No sclera icterus. Extraocular movements grossly intact.  Moist buccal m

ucosa. Head is atraumatic, normocephalic. 


ABDOMEN:  Soft.  Nondistended. Nontender. 


NEUROLOGIC: Alert and oriented. Cranial nerves II through XII grossly intact.


Breast: Large left breast wound on the lateral aspect of the breast into the 

axilla area.  Foul odor.  Scabbing and skin breakdown noted.  


Back has skin breakdown





ASSESSMENT: 


1.  Left breast mass with high suspicion of malignancy status post punch biopsy 

result








PLAN: 


-Follow up with oncology outpatient


-ok for discharge from surgical standpoint when medically clear


-Continue local wound care








Physician Assistant note has been reviewed by physician. Signing provider agrees

with the documented findings, assessment, and plan of care. 





Objective





- Vital Signs


Vital signs: 


                                   Vital Signs











Temp  97.5 F L  12/09/22 08:00


 


Pulse  79   12/09/22 08:00


 


Resp  18   12/09/22 08:00


 


BP  139/82   12/09/22 08:00


 


Pulse Ox  93 L  12/09/22 08:00


 


FiO2      








                                 Intake & Output











 12/08/22 12/09/22 12/09/22





 18:59 06:59 18:59


 


Intake Total 838  


 


Output Total 700 600 


 


Balance 138 -600 


 


Weight  59.5 kg 59.5 kg


 


Intake:   


 


  Oral 838  


 


Output:   


 


  Urine 700 600 


 


Other:   


 


  Voiding Method Indwelling Catheter Indwelling Catheter 


 


  # Bowel Movements 1  














- Labs


CBC & Chem 7: 


                                 12/08/22 06:41





                                 12/08/22 06:41

## 2022-12-09 NOTE — P.PN
Subjective


Progress Note Date: 12/08/22











Patient is a 89-year-old female admitted to the hospital significant indurated 

left breast mass and ulceration and had multiple compression fractures.  Patient

is being followed by ID and general surgery and is empiric antibiotics.  Bone 

biopsy was recommended by ID.


12/5/2022


Patient is currently resting in the bed.  Awake alert and oriented.  No 

complaints of worsening chest wall pain.  No complaints of shortness of breath. 

No nausea vomiting abdominal pain or diarrhea.  Left upper extremity is swollen.

 Oncology is highly suspicious for breast cancer.


Afebrile.  Laboratory data showed WBC 5.3 hemoglobin 11.9 and platelets 322


Sodium 136 potassium 4.6 chloride 106 bicarb is 27 BUN 16 and creatinine 0.60 

and calcium 7.8.


Cultures showing anaerobic gram-negative bacilli, Acinetobacter Flores and 

Enterobacter cloacae.


Patient is being continued antibiotics in the form of Zosyn.





12/6/2022


Patient is currently resting in bed.  Pain is controlled.  No complaints of 

fever or chills.  Patient is scheduled for bone marrow biopsy today.


Yesterday she was placed on back brace due to compression fractures.  Laboratory

data reviewed.  Patient is scheduled for bedside biopsy tomorrow by general 

surgery.





12/7/2022


Patient is lying in the bed.  Awake alert and oriented.  Patient had bone scan 

yesterday showed abnormal uptake T12 and L1 and L2 as can concordant with the CT

findings of compression fractures.  Findings suggest remote trauma to the ribs 

is correlate clinically.  Abnormal uptake involving the mid and lower thoracic 

spine and there is likely degenerative.


Patient had left breast punch biopsy at bedside by general surgery.


Patient otherwise being continued on wound care and antibiotics in the form of 

Zosyn.  ID is on board.





12/08/2022





Patient is seen and evaluated in follow-up with multiple medical consultations 

including infectious disease, general surgery, oncology following. Patient is 

continued on IV zosyn for now and will transition to oral on discharge. Patient 

is post biopsy of the left breast which is pending. Oncology following and disc

ussing possible immune therapy. Patient is not looking to seek treatment with 

chemo/radiation. Patient with significant weakness and working with PT 

recommending ECF and patient is agreeable. [facility] has accepted pending 

clearance of consultations. Continue local wound care. Afebrile and denies chest

pain or shortness of breath. Patient reports to eating with no nausea or 

vomiting noted. 





Review of systems:





Constitutional: No reports of fatigue, fever, or chills


Cardiovascular: No reports of chest pain or palpitations, reports left chest 

wall discomfort


Respiratory: No reports of shortness of breath or cough


GI: No reports of nausea, vomiting, or diarrhea


: No reports of dysuria or retention


Neurovascular:  reports of generalized weakness 





All medications have been reviewed





PHYSICAL EXAMINATION: 





Patient is sitting up in the bed comfortably, no acute distress, awake alert and

oriented.. 


HEENT: Normocephalic. Neck is supple. Pupils reactive. Nostrils clear. Oral 

cavity is moist. 


Neck reveals no JVD, carotid bruits, or thyromegaly. 


CHEST EXAMINATION: Trachea is central. Symmetrical expansion. Lung fields clear 

to auscultation and percussion.


 Left anterior chest wall wound and axillary area is bandaged.


CARDIAC: Normal S1, S2 with no gallops. No murmurs 


ABDOMEN: Soft. Bowel sounds normal. No organomegaly. No abdominal bruits. 


Extremities: reveal no edema.  No clubbing or cyanosis


Neurologically awake, alert, oriented x3 with well-coordinated movements. 

diffuse weakness


Skin: No rash or skin lesions. except for the left breast and chest wall area as

mentioned previously


Psychiatric: Cooperative.  Non-suicidal


Musculoskeletal: No joint swelling or deformity.  Normal range of motion.





Assessment:





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 

post biopsy with results pending


Lumbar compression fractures.


History of thyroidectomy


Prior history of smoking


gait dysfunction 


generalized weakness


DVT prophylaxis


No code








Plan:





Patient does have wound on the left breast and axillary lesion highly suspicious

for malignancy and bacterial infection


Patient is status post punch biopsy of the left breast at bedside by general 

surgery and results pending. Oncology following and will follow up with patient 

outpatient. Possible oral immune therapy being discussed. Patient is not seeking

any other chemo/radiation treatment at this time.  


ID and oncology following.


Continue antibiotics in the form of Zosyn. Will go home on oral cipro and flagyl

for 10 days.  Continue local wound care.  


Case management following and working on ecf and [facility] can accept the patient


Prognosis is guarded at this time..


Possible discharge in 24 hours. 





The impression and plan of care has been dictated by Nurse Danielle Prac

titioner as directed.





Dr. Carito MD


I have performed a history and examination and MDM of this patient, discussed 

the same with the dictator, and  agree with the dictator's assessment and plan 

as written ,documented as a scribe. Based on total visit time,  I have performed

more than 50% of the visit.  





Objective





- Vital Signs


Vital signs: 


                                   Vital Signs











Temp  97.9 F   12/08/22 04:11


 


Pulse  85   12/08/22 04:11


 


Resp  16   12/08/22 04:11


 


BP  136/80   12/08/22 04:11


 


Pulse Ox  93 L  12/08/22 04:11


 


FiO2      








                                 Intake & Output











 12/07/22 12/08/22 12/08/22





 18:59 06:59 18:59


 


Intake Total 240  240


 


Output Total 350 475 


 


Balance -110 -475 240


 


Weight 72.575 kg  


 


Intake:   


 


  Oral 240  240


 


Output:   


 


  Urine 350 475 


 


Other:   


 


  Voiding Method Indwelling Catheter Indwelling Catheter 


 


  # Bowel Movements  1 














- Labs


CBC & Chem 7: 


                                 12/08/22 06:41





                                 12/08/22 06:41


Labs: 


                  Abnormal Lab Results - Last 24 Hours (Table)











  12/08/22 Range/Units





  06:41 


 


Calcium  8.3 L  (8.4-10.2)  mg/dL

## 2022-12-15 NOTE — P.PN
Subjective


Progress Note Date: 12/09/22


Principal diagnosis: 


Left breast/axillary area wound and cellulitis





Patient is 89 year old  female presenting to the hospital with chronic 

nonhealing wound to her left breast axillary area and some foul-smelling 

drainage concerning for possible infected left breast is unremarkable.  

Posterior


On today's evaluation and that is 12/09/2022 patient remains to be afebrile, 

patient is breathing comfortably on room air, the patient left breast/chest wall

pain eye slightly decreased in intensity, no nausea vomiting abdominal pain no 

diarrhea





Objective





- Vital Signs


Vital signs: 


                                   Vital Signs











Temp  97.5 F L  12/09/22 08:00


 


Pulse  79   12/09/22 08:00


 


Resp  18   12/09/22 08:00


 


BP  139/82   12/09/22 08:00


 


Pulse Ox  93 L  12/09/22 08:00


 


FiO2      








                                 Intake & Output











 12/08/22 12/09/22 12/09/22





 18:59 06:59 18:59


 


Intake Total 838  


 


Output Total 700 600 


 


Balance 138 -600 


 


Weight  59.5 kg 59.5 kg


 


Intake:   


 


  Oral 838  


 


Output:   


 


  Urine 700 600 


 


Other:   


 


  Voiding Method Indwelling Catheter Indwelling Catheter 


 


  # Bowel Movements 1  














- Exam


GENERAL DESCRIPTION: An elderly female lying in bed in no distress





RESPIRATORY SYSTEM: Unlabored breathing , decreased breath sounds at bases





HEART: S1 S2 regular rate and rhythm ,


Left breast/axillary area with wound some foul-smelling





ABDOMEN: Soft , no tenderness





EXTREMITIES: No edema feet








- Labs


CBC & Chem 7: 


                                 12/08/22 06:41





                                 12/08/22 06:41





Assessment and Plan


(1) Cellulitis


Status: Acute   Code(s): L03.90 - CELLULITIS, UNSPECIFIED   SNOMED Code(s): 

107053874


   





(2) Wound of left breast


Status: Acute   Priority: High   Code(s): S21.002A - UNSPECIFIED OPEN WOUND OF 

LEFT BREAST, INITIAL ENCOUNTER   SNOMED Code(s): 49240402


   


Plan: 


1patient with a chronic nonhealing wound to the left chest wall and armpit area

with concern for possible malignancy as did have hard margins and no significant

redness some drainage and foul-smelling possible secondary bacterial infection 

not entirely excluded.


2Patient is status post a biopsy of the left breast mass completed on 12/7/2022

with the patient has tolerated


3positive blood culture with Diphtheroid likely skin contamination no need for 

vancomycin


4-patient Local cultures growing Acinetobacter along with Enterobacter which is 

resistant to Unasyn and anaerobes


5-Patient has shown clinical improvement with Zosyn which will be transitioned 

to oral Cipro and Flagyl x10 days on discharge and close outpatient follow-up


Time with Patient: Less than 30

## 2022-12-15 NOTE — P.OP
Date of Procedure: 12/07/22


Preoperative Diagnosis: 


Left breast mass


Postoperative Diagnosis: 


-Left breast mass


Procedure(s) Performed: 


Left breast core biopsy


Anesthesia: local


Surgeon: Ray Hanson


Estimated Blood Loss (ml): 2


Pathology: other (Left breast core biopsy)


Condition: stable


Disposition: PACU


Description of Procedure: 


Patient's placed on her bed in supine position.  Her left breast was prepped and

draped usual sterile fashion.  The breast was anesthetized 1% local Xylocaine.  

Using a punch biopsy scalpel several punch biopsy core biopsies were performed 

of the left breast.  The specimens of pathology.  There was no significant 

bleeding.  Sterile dressings was applied.  Patient tolerated will well.

## 2023-01-20 ENCOUNTER — HOSPITAL ENCOUNTER (OUTPATIENT)
Dept: HOSPITAL 47 - RADPROMAIN | Age: 88
Discharge: HOME | End: 2023-01-20
Attending: INTERNAL MEDICINE
Payer: MEDICARE

## 2023-01-20 VITALS — RESPIRATION RATE: 18 BRPM | TEMPERATURE: 99.1 F

## 2023-01-20 VITALS — HEART RATE: 75 BPM

## 2023-01-20 DIAGNOSIS — J90: Primary | ICD-10-CM

## 2023-01-20 LAB
INR PPP: 0.9 (ref ?–1.2)
PLATELET # BLD AUTO: 455 K/UL (ref 150–450)
PT BLD: 9.9 SEC (ref 9–12)

## 2023-01-20 PROCEDURE — 36415 COLL VENOUS BLD VENIPUNCTURE: CPT

## 2023-01-20 PROCEDURE — 85049 AUTOMATED PLATELET COUNT: CPT

## 2023-01-20 PROCEDURE — 76604 US EXAM CHEST: CPT

## 2023-01-20 PROCEDURE — 85610 PROTHROMBIN TIME: CPT

## 2023-01-20 NOTE — US
Ultrasound-guided therapeutic and diagnostic thoracentesis

 

DATE OF EXAM:  1/20/2023

 

CLINICAL HISTORY:  Left pleural effusion

 

Preliminary imaging demonstrated only a very small amount of pleural fluid. Procedure deferred.

 

 

IMPRESSION: 

1. Deferred thoracentesis due to small amount of pleural fluid.

## 2025-02-28 NOTE — P.PN
Caller: Keri Flores    Relationship to patient: Self    Best call back number: [phone]     Patient is needing: PT IS NEEDING A PA FOR FOR MED WE JUST PRESCRIBED HER           lubiprostone (Amitiza) 8 MCG capsule [[id number]] (Order [id number])               [facility] - [place], [place] - [address] [place] [address] - [phone]  - [phone]   [address] [facility] [address]  Phone: [phone]  Fax: [phone]      Subjective


Progress Note Date: 12/04/22


Principal diagnosis: 


Left breast/axillary area wound and cellulitis





Patient is 89 year old  female presenting to the hospital with chronic 

nonhealing wound to her left breast axillary area and some foul-smelling 

drainage concerning for possible infected left breast is unremarkable.  

Posterior


On today's evaluation and that is 12/04/2022 the patient denies having any fever

or any chills the patient is breathing comfortably patient denies any worsening 

pain to the left breast axillary area no nausea no vomiting no abdominal pain or

diarrhea








Objective





- Vital Signs


Vital signs: 


                                   Vital Signs











Temp  97.1 F L  12/04/22 11:14


 


Pulse  83   12/04/22 11:14


 


Resp  16   12/04/22 11:14


 


BP  129/72   12/04/22 11:14


 


Pulse Ox  94 L  12/04/22 11:14


 


FiO2      








                                 Intake & Output











 12/03/22 12/04/22 12/04/22





 18:59 06:59 18:59


 


Intake Total   305


 


Output Total  250 175


 


Balance  -250 130


 


Weight 72.575 kg  


 


Intake:   


 


  IV   5


 


    Invasive Line 2   5


 


  Oral   300


 


Output:   


 


  Urine  250 175


 


Other:   


 


  Voiding Method Indwelling Catheter Indwelling Catheter Indwelling Catheter














- Exam


GENERAL DESCRIPTION: An elderly female lying in bed in no distress





RESPIRATORY SYSTEM: Unlabored breathing , decreased breath sounds at bases





HEART: S1 S2 regular rate and rhythm ,


Left breast/axillary area with wound some foul-smelling





ABDOMEN: Soft , no tenderness





EXTREMITIES: No edema feet








- Labs


CBC & Chem 7: 


                                 12/04/22 08:41





                                 12/04/22 08:41


Labs: 


                  Abnormal Lab Results - Last 24 Hours (Table)











  12/04/22 12/04/22 Range/Units





  08:41 08:41 


 


RBC  3.54 L   (3.80-5.40)  m/uL


 


MCV  104.0 H   (80.0-100.0)  fL


 


Lymphocytes #  0.7 L   (1.0-4.8)  k/uL


 


Sodium   136 L  (137-145)  mmol/L


 


BUN   19 H  (7-17)  mg/dL


 


Glucose   103 H  (74-99)  mg/dL


 


Calcium   7.9 L  (8.4-10.2)  mg/dL


 


TSH   6.840 H  (0.465-4.680)  mIU/L








                      Microbiology - Last 24 Hours (Table)











 12/02/22 15:15 Blood Culture Gram Stain - Final





 Blood Blood Culture - Final





    Coagulase Negative Staph


 


 12/02/22 15:30 Blood Culture Gram Stain - Preliminary





 Blood 


 


 12/02/22 15:30 Blood Culture - Final





 Blood 


 


 12/02/22 15:53 Gram Stain - Preliminary





 Axilla - Left Wound Culture - Preliminary





    Gram Neg Bacilli


 


 12/02/22 15:15 Blood Culture - Final





 Blood 














Assessment and Plan


(1) Cellulitis


Current Visit: Yes   Status: Acute   Code(s): L03.90 - CELLULITIS, UNSPECIFIED  

SNOMED Code(s): 869981517


   





(2) Wound of left breast


Current Visit: Yes   Status: Acute   Code(s): S21.002A - UNSPECIFIED OPEN WOUND 

OF LEFT BREAST, INITIAL ENCOUNTER   SNOMED Code(s): 92694831


   


Plan: 


1patient with a chronic nonhealing wound to the left chest wall and armpit area

with concern for possible malignancy as did have hard margins and no significant

redness some drainage and foul-smelling possible secondary bacterial infection 

not entirely excluded.


2patient will benefit from biopsy and surgical debridement 


3positive blood culture with staph epi likely skin contamination no need for 

vancomycin


4-patient to continue with the Zosyn while waiting for the culture to finalize 

will check currently growing gram-negative